# Patient Record
Sex: FEMALE | Race: WHITE | Employment: FULL TIME | URBAN - METROPOLITAN AREA
[De-identification: names, ages, dates, MRNs, and addresses within clinical notes are randomized per-mention and may not be internally consistent; named-entity substitution may affect disease eponyms.]

---

## 2017-01-02 ENCOUNTER — HOSPITAL ENCOUNTER (OUTPATIENT)
Dept: PHYSICAL THERAPY | Age: 57
Discharge: HOME | End: 2017-01-02
Payer: COMMERCIAL

## 2017-01-02 PROCEDURE — 97018 PARAFFIN BATH THERAPY: CPT

## 2017-01-02 PROCEDURE — 97110 THERAPEUTIC EXERCISES: CPT

## 2017-01-02 PROCEDURE — 97140 MANUAL THERAPY 1/> REGIONS: CPT

## 2017-01-03 ENCOUNTER — HOSPITAL ENCOUNTER (OUTPATIENT)
Dept: PHYSICAL THERAPY | Age: 57
Discharge: HOME | End: 2017-01-03
Payer: COMMERCIAL

## 2017-01-03 PROCEDURE — 97110 THERAPEUTIC EXERCISES: CPT

## 2017-01-03 PROCEDURE — 97018 PARAFFIN BATH THERAPY: CPT

## 2017-01-03 PROCEDURE — 97140 MANUAL THERAPY 1/> REGIONS: CPT

## 2017-01-03 NOTE — PROGRESS NOTES
Outpatient Rehab at [address] [place] [address], [address] [place] [address],[address] [place], [address]  Phone: [phone]   Fax: [phone]    Outpatient OCCUPATIONAL THERAPY: Daily Note 1/2/2017  Fall Risk Score: 0 (5+ = High Risk)    ICD-10: Treatment Diagnosis: Stiffness of right hand, not elsewhere classified (M25.641)  REFERRING PHYSICIAN: Ivy Ramirez MD MD Orders: Evaluate and treat : ROM ,hand based ulnar gutter splint  Return Physician Appointment: 4 weeks  MEDICAL/REFERRING DIAGNOSIS: Contracture of joint right little finger  DATE OF ONSET: several months ago   DATE OF SURGERY:12/8/2016  PRIOR LEVEL OF FUNCTION: independent  PRECAUTIONS/ALLERGIES:   Allergies   Allergen Reactions    Penicillins Rash      ASSESSMENT:  Ms. Rasta Love presents with decreased functional use, strength and range of motion of her right Little finger MCP joint dorsal aspect. and upper extremity that is affecting her independence with activities of daily living and ability to perform job tasks. I feel that Ms. Rasta Love will benefit from skilled occupational therapy to maximize the functional use of her Little finger MCP joint dorsal aspect. and upper extremity in daily activities and work tasks. ?????? ? ? This section established at most recent assessment??????????  PROBLEM LIST (Impairments causing functional limitations):  1. Pain in right little finger . 2. Decreased motion in right little finger. 3. Decreased strength in right hand. GOALS: (Goals have been discussed and agreed upon with patient.)  SHORT-TERM FUNCTIONAL GOALS: Time Frame: 4 weeks  1. Decrease pain to 4 to allow patient to perform self care tasks. 2. Increase motion in right little finger  by 15 degrees to improve functional use of upper extremity in ADL activities. 3. Increase strength in right hand by 10 pounds to allow patient to  and lift objects during self care activities. DISCHARGE GOALS: Time Frame: 12 weeks  1.  Decrease pain to 1 to allow patient to perform all household and work tasks. 2. Increase motion in right little finger by 30 degreees to allow patient to perform all ADL activities. 3. Increase strength in right hand by 15 pounds to allow patient to , lift, hold, and carry heavy objects. REHABILITATION POTENTIAL FOR STATED GOALS: Scottsara Hoffmann OF CARE:  INTERVENTIONS PLANNED: (Benefits and precautions of occupational therapy have been discussed with the patient.)  1. Modalities that may include fluidotherapy, paraffin, ultrasound, and light therapy. 2. Therapeutic exercise including a home exercise program.  3. Manual therapy. 4. Therapeutic activities. TREATMENT PLAN EFFECTIVE DATES: 12/15/2016 TO 3/15/2017  FREQUENCY/DURATION: Continue to follow patient 2 times a week for 12 weeks to address above goals. Regarding Natalie Wick's therapy, I certify that the treatment plan above will be carried out by a therapist or under their direction. Thank you for this referral,  Angelica Sood OT     Referring Physician Signature: Johanne Chen MD          Date                       SUBJECTIVE:    Present Symptoms: Pain and stiffness in right little finger. Pain Intensity 1: 1  Pain Location 1: Hand  Pain Orientation 1: Right  Dominant Side: right  History of Present Injury/Illness: The patient fractured her right wrist and has developed a contracture in his right little finger. Past Medical History: Ms. Tori Dang  has a past medical history of Anemia; GERD (gastroesophageal reflux disease); and TMJ (temporomandibular joint syndrome). She also has no past medical history of Adverse effect of anesthesia; Difficult intubation; Malignant hyperthermia due to anesthesia; Nausea & vomiting; or Pseudocholinesterase deficiency. .  She also  has a past surgical history that includes fracture tx (Left, 2000); carpal tunnel release (Left, 2/2013); knee arthroscopy (Right); orthopaedic (age 45); and orthopaedic (age 35).    Current Medications:   Current Outpatient Prescriptions on File Prior to Encounter   Medication Sig Dispense Refill    omeprazole (PRILOSEC) 20 mg capsule Take 20 mg by mouth nightly.  naproxen sodium 220 mg cap Take 2 Caps by mouth as needed. Indications: hold until after surgery. Last dose was 6-15-16      traMADol (ULTRAM) 50 mg tablet Take 50 mg by mouth every six (6) hours as needed.  busPIRone (BUSPAR) 15 mg tablet Take 15 mg by mouth nightly. Helps with TMJ pain       No current facility-administered medications on file prior to encounter. Date Last Reviewed: 1/2/2017  Social History/Home Situation:       Work/Activity History: The patient works at [employer]. OBJECTIVE:    Outcome Measures: Tool Used: Disabilities of the Arm, Shoulder and Hand (DASH) Questionnaire - Quick Version  Score:  Initial: 43/55  Most Recent: X/55 (Date: -- )   Interpretation of Score: The DASH is designed to measure the activities of daily living in person's with upper extremity dysfunction or pain. Each section is scored on a 1-5 scale, 5 representing the greatest disability. The scores of each section are added together for a total score of 55. Score 11 12-19 20-28 29-37 38-45 46-54 55   Modifier CH CI CJ CK CL CM CN     ? Carrying, Moving, and Handling Objects:     - CURRENT STATUS: CL - 60%-79% impaired, limited or restricted    - GOAL STATUS:  CK - 40%-59% impaired, limited or restricted    - D/C STATUS:  ---------------To be determined---------------  Range of Motion     AROM:       Right little finger motion is as follows: MP 0/5, PIP -20/55, DIP 0/30 degrees. Tip to distal oconnor crease 3 Cm. Strength:  Not tested due to recent surgery. Sensation:  Intact        Patient stated \"I still cant bend my little finger much. \"    Manual Therapy: (Soft Tissue Mobilization Duration  Duration: 15 Minutes  Duration: 15 Minutes): Technique: Retrograde massage (followed by light tx & PROM)  Tissue Mobilized: Scar/adhesion  Finger Mobilized: 5th digit  RUE Soft Tissue Mobilization: Yes  Technique: Retrograde massage   Therapeutic Exercise:                                                                               : The patient's home exercise program was reviewed. Date:  12/27/16 Date:  12/29/16 Date:  1/2/17 Date: Date:   Activity/Exercise Parameters Parameters Parameters Parameters Parameters   AROM during Fluidotherapy 15 min 15 min 15 min     Paraffin with Stretch 15 min  Finger flexion   15 min  Finger flexion 15 min  Finger flexion     Retrograde massage, Friction Scar massage, Joint Mobilization 15 min  Light tx   15 min  Light tx 15 min  Light tx     Scarf Curl   3 min 3 min 3 min     Washer Game        Individual Gripper          Hand [employer]          Cones          Pegs          Clothes Pins          A-R Bar          Exerstick          Velcro-Roll          BLOCKING BOARDS 5 min 5 min 5 min     RESISTIVE EXERCISES Weight/ Sets/Reps   Weight/ Sets/Reps Weight/ Sets/Reps Weight/ Sets/Reps Weight/ Sets/Reps   WEIGHT WELL        Sup/Pro        UD/RD        Wrist Flex/Ext        Free Weights          UBE(Minutes)          Nautilus        Compound Row        Vertical Chest        Overhead Press                    HEP: As above; handouts given to patient for all exercises. Therapeutic Modalities:      Right Wrist Heat  Type: Paraffin bath (with a finger flexion stretch)  Duration : 15 minutes  Patient Position: Sitting                                        Joint Mobilization:        Treatment Times:  · Therapeutic Exercise: 15 minutes  · Manual Therapy: 15 minutes  · Parafin: 15 minutes  · Whirlpool:  minutes  · Other:  minutes         __________________________________________________________________________________________________  Treatment Assessment:  Patients tolerated treatment well with no complications.   Upon completion of treatment, skin condition was normal.    Progression/Medical Necessity:   · Patient is expected to demonstrate progress in strength and range of motion to increase independence with ADL,household and work activities. .  Compliance with Program/Exercises: compliant most of the time. Reason for Continuation of Services/Other Comments:  ·   Recommendations/Intent for next treatment session: \"Treatment next visit will focus on advancements to more challenging activities\". MD progress note will be completed. Will continue per MD.  Total Treatment Duration:  OT Patient Time In/Time Out  Time In: 0430  Time Out: 0530  Regarding Natalie Wick's therapy, I certify that the treatment plan above will be carried out by a therapist or under their direction.   Thank you for this referral,  Violet Godinez, OT

## 2017-01-04 NOTE — PROGRESS NOTES
Outpatient Rehab at [address] [place] [address], [address] [place] [address],[address] [place], [address]  Phone: [phone]   Fax: [phone]    Outpatient OCCUPATIONAL THERAPY: Daily Note 1/3/2017  Fall Risk Score: 0 (5+ = High Risk)    ICD-10: Treatment Diagnosis: Stiffness of right hand, not elsewhere classified (M25.641)  REFERRING PHYSICIAN: Hector Gardiner MD MD Orders: Evaluate and treat : ROM ,hand based ulnar gutter splint  Return Physician Appointment: 4 weeks  MEDICAL/REFERRING DIAGNOSIS: Contracture of joint right little finger  DATE OF ONSET: several months ago   DATE OF SURGERY:12/8/2016  PRIOR LEVEL OF FUNCTION: independent  PRECAUTIONS/ALLERGIES:   Allergies   Allergen Reactions    Penicillins Rash      ASSESSMENT:  Ms. Murtaza Polk presents with decreased functional use, strength and range of motion of her right Little finger MCP joint dorsal aspect. and upper extremity that is affecting her independence with activities of daily living and ability to perform job tasks. I feel that Ms. Murtaza Polk will benefit from skilled occupational therapy to maximize the functional use of her Little finger MCP joint dorsal aspect. and upper extremity in daily activities and work tasks. ?????? ? ? This section established at most recent assessment??????????  PROBLEM LIST (Impairments causing functional limitations):  1. Pain in right little finger . 2. Decreased motion in right little finger. 3. Decreased strength in right hand. GOALS: (Goals have been discussed and agreed upon with patient.)  SHORT-TERM FUNCTIONAL GOALS: Time Frame: 4 weeks  1. Decrease pain to 4 to allow patient to perform self care tasks. 2. Increase motion in right little finger  by 15 degrees to improve functional use of upper extremity in ADL activities. 3. Increase strength in right hand by 10 pounds to allow patient to  and lift objects during self care activities. DISCHARGE GOALS: Time Frame: 12 weeks  1.  Decrease pain to 1 to allow patient to perform all household and work tasks. 2. Increase motion in right little finger by 30 degreees to allow patient to perform all ADL activities. 3. Increase strength in right hand by 15 pounds to allow patient to , lift, hold, and carry heavy objects. REHABILITATION POTENTIAL FOR STATED GOALS: Nuris Auguste OF CARE:  INTERVENTIONS PLANNED: (Benefits and precautions of occupational therapy have been discussed with the patient.)  1. Modalities that may include fluidotherapy, paraffin, ultrasound, and light therapy. 2. Therapeutic exercise including a home exercise program.  3. Manual therapy. 4. Therapeutic activities. TREATMENT PLAN EFFECTIVE DATES: 12/15/2016 TO 3/15/2017  FREQUENCY/DURATION: Continue to follow patient 2 times a week for 12 weeks to address above goals. Regarding Natalie Wick's therapy, I certify that the treatment plan above will be carried out by a therapist or under their direction. Thank you for this referral,  Kaur Hong OT     Referring Physician Signature: Jose Jean MD          Date                       SUBJECTIVE:    Present Symptoms: Pain and stiffness in right little finger. Pain Intensity 1: 1  Pain Location 1: Hand (little finger)  Pain Orientation 1: Right  Dominant Side: right  History of Present Injury/Illness: The patient fractured her right wrist and has developed a contracture in his right little finger. Past Medical History: Ms. Rosales Boyer  has a past medical history of Anemia; GERD (gastroesophageal reflux disease); and TMJ (temporomandibular joint syndrome). She also has no past medical history of Adverse effect of anesthesia; Difficult intubation; Malignant hyperthermia due to anesthesia; Nausea & vomiting; or Pseudocholinesterase deficiency. .  She also  has a past surgical history that includes fracture tx (Left, 2000); carpal tunnel release (Left, 2/2013); knee arthroscopy (Right); orthopaedic (age 45); and orthopaedic (age 35).   Current Medications:   Current Outpatient Prescriptions on File Prior to Encounter   Medication Sig Dispense Refill    omeprazole (PRILOSEC) 20 mg capsule Take 20 mg by mouth nightly.  naproxen sodium 220 mg cap Take 2 Caps by mouth as needed. Indications: hold until after surgery. Last dose was 6-15-16      traMADol (ULTRAM) 50 mg tablet Take 50 mg by mouth every six (6) hours as needed.  busPIRone (BUSPAR) 15 mg tablet Take 15 mg by mouth nightly. Helps with TMJ pain       No current facility-administered medications on file prior to encounter. Date Last Reviewed: 1/2/2017  Social History/Home Situation:       Work/Activity History: The patient works at [employer]. OBJECTIVE:    Outcome Measures: Tool Used: Disabilities of the Arm, Shoulder and Hand (DASH) Questionnaire - Quick Version  Score:  Initial: 43/55  Most Recent: X/55 (Date: -- )   Interpretation of Score: The DASH is designed to measure the activities of daily living in person's with upper extremity dysfunction or pain. Each section is scored on a 1-5 scale, 5 representing the greatest disability. The scores of each section are added together for a total score of 55. Score 11 12-19 20-28 29-37 38-45 46-54 55   Modifier CH CI CJ CK CL CM CN     ? Carrying, Moving, and Handling Objects:     - CURRENT STATUS: CL - 60%-79% impaired, limited or restricted    - GOAL STATUS:  CK - 40%-59% impaired, limited or restricted    - D/C STATUS:  ---------------To be determined---------------  Range of Motion     AROM:       Right little finger motion is as follows: MP 0/5, PIP -20/55, DIP 0/30 degrees. Tip to distal oconnor crease 3 Cm. Strength:  Not tested due to recent surgery. Sensation:  Intact        Patient stated \"I still cant bend my little finger much. \"    Manual Therapy: (Soft Tissue Mobilization Duration  Duration: 15 Minutes  Duration: 15 Minutes): Technique: Retrograde massage (with light tx & PROM)  Tissue Mobilized: Scar/adhesion  RUE Soft Tissue Mobilization: Yes  Technique: Retrograde massage   Therapeutic Exercise:                                                                               : The patient's home exercise program was reviewed. Date:  12/27/16 Date:  12/29/16 Date:  1/2/17 Date:  1/3/17 Date:   Activity/Exercise Parameters Parameters Parameters Parameters Parameters   AROM during Fluidotherapy 15 min 15 min 15 min 15 min    Paraffin with Stretch 15 min  Finger flexion   15 min  Finger flexion 15 min  Finger flexion 15 min  Finger flexion    Retrograde massage, Friction Scar massage, Joint Mobilization 15 min  Light tx   15 min  Light tx 15 min  Light tx 15 min  Light tx    Scarf Curl   3 min 3 min 3 min 2 min    Washer Game        Individual Gripper          Hand [place]          Cones          Pegs          Clothes Pins          A-R Bar          Exerstick          PROM      2 min    BLOCKING BOARDS 5 min 5 min 5 min 3 min    RESISTIVE EXERCISES Weight/ Sets/Reps   Weight/ Sets/Reps Weight/ Sets/Reps Weight/ Sets/Reps Weight/ Sets/Reps   WEIGHT WELL        Sup/Pro        UD/RD        Wrist Flex/Ext        Free Weights          UBE(Minutes)          Nautilus        Compound Row        Vertical Chest        Overhead Press                    HEP: As above; handouts given to patient for all exercises.     Therapeutic Modalities:      Right Wrist Heat  Type: Paraffin bath (with a finger flexion stretch)  Duration : 15 minutes  Patient Position: Sitting                                        Joint Mobilization:        Treatment Times:  · Therapeutic Exercise: 15 minutes  · Manual Therapy: 15 minutes  · Parafin: 15 minutes  · Whirlpool:  minutes  · Other:  minutes         __________________________________________________________________________________________________  Treatment Assessment:  Patients tolerated treatment well with no complications. Upon completion of treatment, skin condition was normal.    Progression/Medical Necessity:   · Patient is expected to demonstrate progress in strength and range of motion to increase independence with ADL,household and work activities. .  Compliance with Program/Exercises: compliant most of the time. Reason for Continuation of Services/Other Comments:  ·   Recommendations/Intent for next treatment session: \"Treatment next visit will focus on advancements to more challenging activities\". MD progress note  completed. Will continue per MD.  Total Treatment Duration:  OT Patient Time In/Time Out  Time In: 0130  Time Out: 0215  Regarding Natalie Wick's therapy, I certify that the treatment plan above will be carried out by a therapist or under their direction.   Thank you for this referral,  Yariel Orlando, OT

## 2017-01-12 ENCOUNTER — HOSPITAL ENCOUNTER (OUTPATIENT)
Dept: PHYSICAL THERAPY | Age: 57
Discharge: HOME | End: 2017-01-12
Payer: COMMERCIAL

## 2017-01-12 PROCEDURE — 97140 MANUAL THERAPY 1/> REGIONS: CPT

## 2017-01-12 PROCEDURE — 97018 PARAFFIN BATH THERAPY: CPT

## 2017-01-12 PROCEDURE — 97110 THERAPEUTIC EXERCISES: CPT

## 2017-01-12 NOTE — PROGRESS NOTES
Outpatient Rehab at [address] [place] [address], [address] [place] [address],[address] [place], [address]  Phone: [phone]   Fax: [phone]    Outpatient OCCUPATIONAL THERAPY: Daily Note 1/12/2017  Fall Risk Score: 0 (5+ = High Risk)    ICD-10: Treatment Diagnosis: Stiffness of right hand, not elsewhere classified (M25.641)  REFERRING PHYSICIAN: Oracio Champagne MD MD Orders: Evaluate and treat : ROM ,hand based ulnar gutter splint  Return Physician Appointment: 4 weeks  MEDICAL/REFERRING DIAGNOSIS: Contracture of joint right little finger  DATE OF ONSET: several months ago   DATE OF SURGERY:12/8/2016  PRIOR LEVEL OF FUNCTION: independent  PRECAUTIONS/ALLERGIES:   Allergies   Allergen Reactions    Penicillins Rash      ASSESSMENT:  Ms. Adonis Khan presents with decreased functional use, strength and range of motion of her right Little finger MCP joint dorsal aspect. and upper extremity that is affecting her independence with activities of daily living and ability to perform job tasks. I feel that Ms. Adonis Khan will benefit from skilled occupational therapy to maximize the functional use of her Little finger MCP joint dorsal aspect. and upper extremity in daily activities and work tasks. ?????? ? ? This section established at most recent assessment??????????  PROBLEM LIST (Impairments causing functional limitations):  1. Pain in right little finger . 2. Decreased motion in right little finger. 3. Decreased strength in right hand. GOALS: (Goals have been discussed and agreed upon with patient.)  SHORT-TERM FUNCTIONAL GOALS: Time Frame: 4 weeks  1. Decrease pain to 4 to allow patient to perform self care tasks. 2. Increase motion in right little finger  by 15 degrees to improve functional use of upper extremity in ADL activities. 3. Increase strength in right hand by 10 pounds to allow patient to  and lift objects during self care activities. DISCHARGE GOALS: Time Frame: 12 weeks  1.  Decrease pain to 1 to allow patient to perform all household and work tasks. 2. Increase motion in right little finger by 30 degreees to allow patient to perform all ADL activities. 3. Increase strength in right hand by 15 pounds to allow patient to , lift, hold, and carry heavy objects. REHABILITATION POTENTIAL FOR STATED GOALS: Norma Dejesus OF CARE:  INTERVENTIONS PLANNED: (Benefits and precautions of occupational therapy have been discussed with the patient.)  1. Modalities that may include fluidotherapy, paraffin, ultrasound, and light therapy. 2. Therapeutic exercise including a home exercise program.  3. Manual therapy. 4. Therapeutic activities. TREATMENT PLAN EFFECTIVE DATES: 12/15/2016 TO 3/15/2017  FREQUENCY/DURATION: Continue to follow patient 2 times a week for 12 weeks to address above goals. Regarding Natalie Wick's therapy, I certify that the treatment plan above will be carried out by a therapist or under their direction. Thank you for this referral,  Frederick Villasenor, OT     Referring Physician Signature: Pauline Welsh MD          Date                       SUBJECTIVE:    Present Symptoms: Pain and stiffness in right little finger. Pain Intensity 1: 0  Pain Location 1: Hand  Pain Orientation 1: Right  Dominant Side: right  History of Present Injury/Illness: The patient fractured her right wrist and has developed a contracture in his right little finger. Past Medical History: Ms. Avi Murphy  has a past medical history of Anemia; GERD (gastroesophageal reflux disease); and TMJ (temporomandibular joint syndrome). She also has no past medical history of Adverse effect of anesthesia; Difficult intubation; Malignant hyperthermia due to anesthesia; Nausea & vomiting; or Pseudocholinesterase deficiency. .  She also  has a past surgical history that includes fracture tx (Left, 2000); carpal tunnel release (Left, 2/2013); knee arthroscopy (Right); orthopaedic (age 45); and orthopaedic (age 35).    Current Medications:   Current Outpatient Prescriptions on File Prior to Encounter   Medication Sig Dispense Refill    omeprazole (PRILOSEC) 20 mg capsule Take 20 mg by mouth nightly.  naproxen sodium 220 mg cap Take 2 Caps by mouth as needed. Indications: hold until after surgery. Last dose was 6-15-16      traMADol (ULTRAM) 50 mg tablet Take 50 mg by mouth every six (6) hours as needed.  busPIRone (BUSPAR) 15 mg tablet Take 15 mg by mouth nightly. Helps with TMJ pain       No current facility-administered medications on file prior to encounter. Date Last Reviewed: 1/12/2017   Social History/Home Situation:       Work/Activity History: The patient works at [employer]. OBJECTIVE:    Outcome Measures: Tool Used: Disabilities of the Arm, Shoulder and Hand (DASH) Questionnaire - Quick Version  Score:  Initial: 43/55  Most Recent: X/55 (Date: -- )   Interpretation of Score: The DASH is designed to measure the activities of daily living in person's with upper extremity dysfunction or pain. Each section is scored on a 1-5 scale, 5 representing the greatest disability. The scores of each section are added together for a total score of 55. Score 11 12-19 20-28 29-37 38-45 46-54 55   Modifier CH CI CJ CK CL CM CN     ? Carrying, Moving, and Handling Objects:     - CURRENT STATUS: CL - 60%-79% impaired, limited or restricted    - GOAL STATUS:  CK - 40%-59% impaired, limited or restricted    - D/C STATUS:  ---------------To be determined---------------  Range of Motion     AROM:       Right little finger motion is as follows: MP 0/5, PIP -20/55, DIP 0/30 degrees. Tip to distal oconnor crease 3 Cm. Strength:  Not tested due to recent surgery. Sensation:  Intact        Patient stated \"I just can't move my little finger. \"    Manual Therapy: (Soft Tissue Mobilization Duration  Duration: 15 Minutes  Duration: 15 Minutes): Technique: Retrograde massage (with a finger flexion stretch)  Tissue Mobilized: Scar/adhesion  RUE Soft Tissue Mobilization: Yes   Therapeutic Exercise:                                                                               : The patient's home exercise program was reviewed. Date:  12/27/16 Date:  12/29/16 Date:  1/2/17 Date:  1/3/17 Date:  1/12/17   Activity/Exercise Parameters Parameters Parameters Parameters Parameters   AROM during Fluidotherapy 15 min 15 min 15 min 15 min 15 min   Paraffin with Stretch 15 min  Finger flexion   15 min  Finger flexion 15 min  Finger flexion 15 min  Finger flexion 15 min  Finger flexion   Retrograde massage, Friction Scar massage, Joint Mobilization 15 min  Light tx   15 min  Light tx 15 min  Light tx 15 min  Light tx 15 min  Light tx   Scarf Curl   3 min 3 min 3 min 2 min 2 min   Washer Game        Individual Gripper       40 reps   Hand Deaver          Cones          Pegs          Clothes Pins          A-R Bar          Exerstick          PROM      2 min 2 min   BLOCKING BOARDS 5 min 5 min 5 min 3 min    RESISTIVE EXERCISES Weight/ Sets/Reps   Weight/ Sets/Reps Weight/ Sets/Reps Weight/ Sets/Reps Weight/ Sets/Reps   WEIGHT WELL        Sup/Pro        UD/RD        Wrist Flex/Ext        Free Weights          UBE(Minutes)          Nautilus        Compound Row        Vertical Chest        Overhead Press                    HEP: As above; handouts given to patient for all exercises.     Therapeutic Modalities:      Right Wrist Heat  Type: Paraffin bath (with a finger flexion stretch)  Duration : 15 minutes  Patient Position: Sitting                                        Joint Mobilization:        Treatment Times:  · Therapeutic Exercise: 15 minutes  · Manual Therapy: 15 minutes  · Parafin: 15 minutes  · Whirlpool:  minutes  · Other:  minutes         __________________________________________________________________________________________________  Treatment Assessment: Patients tolerated treatment well with no complications. Upon completion of treatment, skin condition was normal.    Progression/Medical Necessity:   · Patient is expected to demonstrate progress in strength and range of motion to increase independence with ADL,household and work activities. .  Compliance with Program/Exercises: compliant most of the time. Reason for Continuation of Services/Other Comments:  ·   Recommendations/Intent for next treatment session: \"Treatment next visit will focus on advancements to more challenging activities\"  Will continue per MD.  Total Treatment Duration:  OT Patient Time In/Time Out  Time In: 1100  Time Out: [facility] therapy, I certify that the treatment plan above will be carried out by a therapist or under their direction.   Thank you for this referral,  Peter Morales, OT

## 2017-01-16 ENCOUNTER — HOSPITAL ENCOUNTER (OUTPATIENT)
Dept: PHYSICAL THERAPY | Age: 57
Discharge: HOME | End: 2017-01-16
Payer: COMMERCIAL

## 2017-01-16 PROCEDURE — 97140 MANUAL THERAPY 1/> REGIONS: CPT

## 2017-01-16 PROCEDURE — 97018 PARAFFIN BATH THERAPY: CPT

## 2017-01-16 PROCEDURE — 97110 THERAPEUTIC EXERCISES: CPT

## 2017-01-17 NOTE — PROGRESS NOTES
Outpatient Rehab at [address] [place] [address], [address] [place] [address],[address] [place], [address]  Phone: [phone]   Fax: [phone]    Outpatient OCCUPATIONAL THERAPY: Daily Note 1/16/2017  Fall Risk Score: 0 (5+ = High Risk)    ICD-10: Treatment Diagnosis: Stiffness of right hand, not elsewhere classified (M25.641)  REFERRING PHYSICIAN: Perez Velasco MD MD Orders: Evaluate and treat : ROM ,hand based ulnar gutter splint  Return Physician Appointment: 4 weeks  MEDICAL/REFERRING DIAGNOSIS: Contracture of joint right little finger  DATE OF ONSET: several months ago   DATE OF SURGERY:12/8/2016  PRIOR LEVEL OF FUNCTION: independent  PRECAUTIONS/ALLERGIES:   Allergies   Allergen Reactions    Penicillins Rash      ASSESSMENT:  Ms. Meño Dumont presents with decreased functional use, strength and range of motion of her right Little finger MCP joint dorsal aspect. and upper extremity that is affecting her independence with activities of daily living and ability to perform job tasks. I feel that Ms. Meño Dumont will benefit from skilled occupational therapy to maximize the functional use of her Little finger MCP joint dorsal aspect. and upper extremity in daily activities and work tasks. ?????? ? ? This section established at most recent assessment??????????  PROBLEM LIST (Impairments causing functional limitations):  1. Pain in right little finger . 2. Decreased motion in right little finger. 3. Decreased strength in right hand. GOALS: (Goals have been discussed and agreed upon with patient.)  SHORT-TERM FUNCTIONAL GOALS: Time Frame: 4 weeks  1. Decrease pain to 4 to allow patient to perform self care tasks. 2. Increase motion in right little finger  by 15 degrees to improve functional use of upper extremity in ADL activities. 3. Increase strength in right hand by 10 pounds to allow patient to  and lift objects during self care activities. DISCHARGE GOALS: Time Frame: 12 weeks  1.  Decrease pain to 1 to allow patient to perform all household and work tasks. 2. Increase motion in right little finger by 30 degreees to allow patient to perform all ADL activities. 3. Increase strength in right hand by 15 pounds to allow patient to , lift, hold, and carry heavy objects. REHABILITATION POTENTIAL FOR STATED GOALS: [facility] OF CARE:  INTERVENTIONS PLANNED: (Benefits and precautions of occupational therapy have been discussed with the patient.)  1. Modalities that may include fluidotherapy, paraffin, ultrasound, and light therapy. 2. Therapeutic exercise including a home exercise program.  3. Manual therapy. 4. Therapeutic activities. TREATMENT PLAN EFFECTIVE DATES: 12/15/2016 TO 3/15/2017  FREQUENCY/DURATION: Continue to follow patient 2 times a week for 12 weeks to address above goals. Regarding Natalie Wick's therapy, I certify that the treatment plan above will be carried out by a therapist or under their direction. Thank you for this referral,  [facility]      Referring Physician Signature: Sherry Cowart MD          Date                       SUBJECTIVE:    Present Symptoms: Pain and stiffness in right little finger. Pain Intensity 1: 0  Pain Location 1: Hand  Pain Orientation 1: Right  Dominant Side: right  History of Present Injury/Illness: The patient fractured her right wrist and has developed a contracture in his right little finger. Past Medical History: Ms. Jonathon Herrera  has a past medical history of Anemia; GERD (gastroesophageal reflux disease); and TMJ (temporomandibular joint syndrome). She also has no past medical history of Adverse effect of anesthesia; Difficult intubation; Malignant hyperthermia due to anesthesia; Nausea & vomiting; or Pseudocholinesterase deficiency. .  She also  has a past surgical history that includes fracture tx (Left, 2000); carpal tunnel release (Left, 2/2013); knee arthroscopy (Right); orthopaedic (age 45); and orthopaedic (age 35).    Current Medications:   Current Outpatient Prescriptions on File Prior to Encounter   Medication Sig Dispense Refill    omeprazole (PRILOSEC) 20 mg capsule Take 20 mg by mouth nightly.  naproxen sodium 220 mg cap Take 2 Caps by mouth as needed. Indications: hold until after surgery. Last dose was 6-15-16      traMADol (ULTRAM) 50 mg tablet Take 50 mg by mouth every six (6) hours as needed.  busPIRone (BUSPAR) 15 mg tablet Take 15 mg by mouth nightly. Helps with TMJ pain       No current facility-administered medications on file prior to encounter. Date Last Reviewed: 1/17/2017   Social History/Home Situation:       Work/Activity History: The patient works at [employer]. OBJECTIVE:    Outcome Measures: Tool Used: Disabilities of the Arm, Shoulder and Hand (DASH) Questionnaire - Quick Version  Score:  Initial: 43/55  Most Recent: X/55 (Date: -- )   Interpretation of Score: The DASH is designed to measure the activities of daily living in person's with upper extremity dysfunction or pain. Each section is scored on a 1-5 scale, 5 representing the greatest disability. The scores of each section are added together for a total score of 55. Score 11 12-19 20-28 29-37 38-45 46-54 55   Modifier CH CI CJ CK CL CM CN     ? Carrying, Moving, and Handling Objects:     - CURRENT STATUS: CL - 60%-79% impaired, limited or restricted    - GOAL STATUS:  CK - 40%-59% impaired, limited or restricted    - D/C STATUS:  ---------------To be determined---------------  Range of Motion     AROM:       Right little finger motion is as follows: MP 0/5, PIP -20/55, DIP 0/30 degrees. Tip to distal oconnor crease 3 Cm. Strength:  Not tested due to recent surgery. Sensation:  Intact        Patient stated \"I just can't move my little finger. \"    Manual Therapy: (Soft Tissue Mobilization Duration  Duration: 15 Minutes  Duration: 15 Minutes): Technique: Retrograde massage (followed by PROM)  Tissue Mobilized: Scar/adhesion  RUE Soft Tissue Mobilization: Yes  Technique: Retrograde massage   Therapeutic Exercise:                                                                               : The patient's home exercise program was reviewed. Date:  1/16/17 Date:  12/29/16 Date:  1/2/17 Date:  1/3/17 Date:  1/12/17   Activity/Exercise Parameters Parameters Parameters Parameters Parameters   AROM during Fluidotherapy 15 min 15 min 15 min 15 min 15 min   Paraffin with Stretch 15 min  Finger flexion   15 min  Finger flexion 15 min  Finger flexion 15 min  Finger flexion 15 min  Finger flexion   Retrograde massage, Friction Scar massage, Joint Mobilization 15 min  Light tx   15 min  Light tx 15 min  Light tx 15 min  Light tx 15 min  Light tx   Scarf Curl   3 min 3 min 3 min 2 min 2 min   Washer Game        Individual Gripper       40 reps   Hand Ronald          Cones          Pegs          Clothes Pins          A-R Bar          Exerstick          PROM 2 min     2 min 2 min   BLOCKING BOARDS  5 min 5 min 3 min    RESISTIVE EXERCISES Weight/ Sets/Reps   Weight/ Sets/Reps Weight/ Sets/Reps Weight/ Sets/Reps Weight/ Sets/Reps   WEIGHT WELL        Sup/Pro        UD/RD        Wrist Flex/Ext        Free Weights          UBE(Minutes)          Nautilus        Compound Row        Vertical Chest        Overhead Press                    HEP: As above; handouts given to patient for all exercises.     Therapeutic Modalities:      Right Wrist Heat  Type: Paraffin bath (with a finger flexion stretch)  Duration : 15 minutes  Patient Position: Sitting                                        Joint Mobilization:        Treatment Times:  · Therapeutic Exercise: 15 minutes  · Manual Therapy: 15 minutes  · Parafin: 15 minutes  · Whirlpool:  minutes  · Other:  minutes         __________________________________________________________________________________________________  Treatment Assessment:  Patients tolerated treatment well with no complications. Upon completion of treatment, skin condition was normal.    Progression/Medical Necessity:   · Patient is expected to demonstrate progress in strength and range of motion to increase independence with ADL,household and work activities. .  Compliance with Program/Exercises: compliant most of the time. Reason for Continuation of Services/Other Comments:  ·   Recommendations/Intent for next treatment session: \"Treatment next visit will focus on advancements to more challenging activities\"  Will continue per MD.  Total Treatment Duration:  OT Patient Time In/Time Out  Time In: 0330  Time Out: 0415  Regarding Natalie Wick's therapy, I certify that the treatment plan above will be carried out by a therapist or under their direction.   Thank you for this referral,  Violet Godinez OT

## 2017-01-24 ENCOUNTER — APPOINTMENT (OUTPATIENT)
Dept: PHYSICAL THERAPY | Age: 57
End: 2017-01-24
Payer: COMMERCIAL

## 2017-01-30 ENCOUNTER — HOSPITAL ENCOUNTER (OUTPATIENT)
Dept: PHYSICAL THERAPY | Age: 57
Discharge: HOME | End: 2017-01-30
Payer: COMMERCIAL

## 2017-01-30 PROCEDURE — 97140 MANUAL THERAPY 1/> REGIONS: CPT

## 2017-01-30 PROCEDURE — 97018 PARAFFIN BATH THERAPY: CPT

## 2017-01-30 PROCEDURE — 97110 THERAPEUTIC EXERCISES: CPT

## 2017-01-31 ENCOUNTER — APPOINTMENT (OUTPATIENT)
Dept: PHYSICAL THERAPY | Age: 57
End: 2017-01-31
Payer: COMMERCIAL

## 2017-01-31 NOTE — PROGRESS NOTES
Outpatient Rehab at [address] [place] [address], [address] [place] [address],[address] [place], [address]  Phone: [phone]   Fax: [phone]    Outpatient OCCUPATIONAL THERAPY: Daily Note 1/30/2017  Fall Risk Score: 0 (5+ = High Risk)    ICD-10: Treatment Diagnosis: Stiffness of right hand, not elsewhere classified (M25.641)  REFERRING PHYSICIAN: Mini Enamorado MD MD Orders: Evaluate and treat : ROM ,hand based ulnar gutter splint  Return Physician Appointment: 4 weeks  MEDICAL/REFERRING DIAGNOSIS: Contracture of joint right little finger  DATE OF ONSET: several months ago   DATE OF SURGERY:12/8/2016  PRIOR LEVEL OF FUNCTION: independent  PRECAUTIONS/ALLERGIES:   Allergies   Allergen Reactions    Penicillins Rash      ASSESSMENT:  Ms. Tony Alcaraz presents with decreased functional use, strength and range of motion of her right Little finger MCP joint dorsal aspect. and upper extremity that is affecting her independence with activities of daily living and ability to perform job tasks. I feel that Ms. Tony Alcaraz will benefit from skilled occupational therapy to maximize the functional use of her Little finger MCP joint dorsal aspect. and upper extremity in daily activities and work tasks. ?????? ? ? This section established at most recent assessment??????????  PROBLEM LIST (Impairments causing functional limitations):  1. Pain in right little finger . 2. Decreased motion in right little finger. 3. Decreased strength in right hand. GOALS: (Goals have been discussed and agreed upon with patient.)  SHORT-TERM FUNCTIONAL GOALS: Time Frame: 4 weeks  1. Decrease pain to 4 to allow patient to perform self care tasks. 2. Increase motion in right little finger  by 15 degrees to improve functional use of upper extremity in ADL activities. 3. Increase strength in right hand by 10 pounds to allow patient to  and lift objects during self care activities. DISCHARGE GOALS: Time Frame: 12 weeks  1.  Decrease pain to 1 to allow patient to perform all household and work tasks. 2. Increase motion in right little finger by 30 degreees to allow patient to perform all ADL activities. 3. Increase strength in right hand by 15 pounds to allow patient to , lift, hold, and carry heavy objects. REHABILITATION POTENTIAL FOR STATED GOALS: Margaret Cabello OF CARE:  INTERVENTIONS PLANNED: (Benefits and precautions of occupational therapy have been discussed with the patient.)  1. Modalities that may include fluidotherapy, paraffin, ultrasound, and light therapy. 2. Therapeutic exercise including a home exercise program.  3. Manual therapy. 4. Therapeutic activities. TREATMENT PLAN EFFECTIVE DATES: 12/15/2016 TO 3/15/2017  FREQUENCY/DURATION: Continue to follow patient 2 times a week for 12 weeks to address above goals. Regarding Natalie Wick's therapy, I certify that the treatment plan above will be carried out by a therapist or under their direction. Thank you for this referral,  Misael Corona OT     Referring Physician Signature: Ivy Ramirez MD          Date                       SUBJECTIVE:    Present Symptoms: Pain and stiffness in right little finger. Pain Intensity 1: 0  Pain Location 1: Hand  Pain Orientation 1: Right  Dominant Side: right  History of Present Injury/Illness: The patient fractured her right wrist and has developed a contracture in his right little finger. Past Medical History: Ms. Rasta Love  has a past medical history of Anemia; GERD (gastroesophageal reflux disease); and TMJ (temporomandibular joint syndrome). She also has no past medical history of Adverse effect of anesthesia; Difficult intubation; Malignant hyperthermia due to anesthesia; Nausea & vomiting; or Pseudocholinesterase deficiency. .  She also  has a past surgical history that includes fracture tx (Left, 2000); carpal tunnel release (Left, 2/2013); knee arthroscopy (Right); orthopaedic (age 45); and orthopaedic (age 35).    Current Medications:   Current Outpatient Prescriptions on File Prior to Encounter   Medication Sig Dispense Refill    omeprazole (PRILOSEC) 20 mg capsule Take 20 mg by mouth nightly.  naproxen sodium 220 mg cap Take 2 Caps by mouth as needed. Indications: hold until after surgery. Last dose was 6-15-16      traMADol (ULTRAM) 50 mg tablet Take 50 mg by mouth every six (6) hours as needed.  busPIRone (BUSPAR) 15 mg tablet Take 15 mg by mouth nightly. Helps with TMJ pain       No current facility-administered medications on file prior to encounter. Date Last Reviewed: 1/30/2017  Social History/Home Situation:       Work/Activity History: The patient works at [employer]. OBJECTIVE:    Outcome Measures: Tool Used: Disabilities of the Arm, Shoulder and Hand (DASH) Questionnaire - Quick Version  Score:  Initial: 43/55  Most Recent: X/55 (Date: -- )   Interpretation of Score: The DASH is designed to measure the activities of daily living in person's with upper extremity dysfunction or pain. Each section is scored on a 1-5 scale, 5 representing the greatest disability. The scores of each section are added together for a total score of 55. Score 11 12-19 20-28 29-37 38-45 46-54 55   Modifier CH CI CJ CK CL CM CN     ? Carrying, Moving, and Handling Objects:     - CURRENT STATUS: CL - 60%-79% impaired, limited or restricted    - GOAL STATUS:  CK - 40%-59% impaired, limited or restricted    - D/C STATUS:  ---------------To be determined---------------  Range of Motion     AROM:       Right little finger motion is as follows: MP 0/5, PIP -20/55, DIP 0/30 degrees. Tip to distal oconnor crease 3 Cm. Strength:  Not tested due to recent surgery. Sensation:  Intact        Patient stated \"I am about the same. \"    Manual Therapy: (Soft Tissue Mobilization Duration  Duration: 15 Minutes  Duration: 15 Minutes): Technique: Retrograde massage (followed by PROM)  Tissue Mobilized: Scar/adhesion  RUE Soft Tissue Mobilization: Yes  Technique: Retrograde massage   Therapeutic Exercise:                                                                               : The patient's home exercise program was reviewed. Date:  1/16/17 Date:  1/30/17 Date:  1/2/17 Date:  1/3/17 Date:  1/12/17   Activity/Exercise Parameters Parameters Parameters Parameters Parameters   AROM during Fluidotherapy 15 min 15 min 15 min 15 min 15 min   Paraffin with Stretch 15 min  Finger flexion   15 min  Finger flexion 15 min  Finger flexion 15 min  Finger flexion 15 min  Finger flexion   Retrograde massage, Friction Scar massage, Joint Mobilization 15 min  Light tx   15 min  Light tx 15 min  Light tx 15 min  Light tx 15 min  Light tx   Scarf Curl   3 min 3 min 3 min 2 min 2 min   Washer Game        Individual Gripper       40 reps   Hand [place]          Cones          Pegs          Clothes Pins          A-R Bar          Exerstick          PROM 2 min     2 min 2 min   BLOCKING BOARDS   5 min 3 min    RESISTIVE EXERCISES Weight/ Sets/Reps   Weight/ Sets/Reps Weight/ Sets/Reps Weight/ Sets/Reps Weight/ Sets/Reps   WEIGHT WELL        Sup/Pro        UD/RD        Wrist Flex/Ext        Free Weights          UBE(Minutes)          Nautilus        Compound Row        Vertical Chest        Overhead Press                    HEP: As above; handouts given to patient for all exercises.     Therapeutic Modalities:      Right Wrist Heat  Type: Paraffin bath (with a finger flexion stretch)  Duration : 15 minutes  Patient Position: Sitting                                        Joint Mobilization:        Treatment Times:  · Therapeutic Exercise: 15 minutes  · Manual Therapy: 15 minutes  · Parafin: 15 minutes  · Whirlpool:  minutes  · Other:  minutes         __________________________________________________________________________________________________  Treatment Assessment: Patients tolerated treatment well with no complications. Upon completion of treatment, skin condition was normal.    Progression/Medical Necessity:   · Patient is expected to demonstrate progress in strength and range of motion to increase independence with ADL,household and work activities. .  Compliance with Program/Exercises: compliant most of the time. Reason for Continuation of Services/Other Comments:  ·   Recommendations/Intent for next treatment session: \"Treatment next visit will focus on advancements to more challenging activities\"  MD progress note will be completed. .  Total Treatment Duration:  OT Patient Time In/Time Out  Time In: 0100  Time Out: 0145  Regarding Natalie Wick's therapy, I certify that the treatment plan above will be carried out by a therapist or under their direction.   Thank you for this referral,  Colt Vega, OT

## 2017-03-02 NOTE — PROGRESS NOTES
Outpatient Rehab at [address] [place] [address], [address] [place] [address],[address] [place], [address]  Phone: [phone]   Fax: [phone]    Outpatient OCCUPATIONAL THERAPY: Discharge   Fall Risk Score: 0 (5+ = High Risk)    ICD-10: Treatment Diagnosis: Stiffness of right hand, not elsewhere classified (M25.641)  REFERRING PHYSICIAN: Christian Muse MD MD Orders: Evaluate and treat : ROM ,hand based ulnar gutter splint  Return Physician Appointment: 4 weeks  MEDICAL/REFERRING DIAGNOSIS: Contracture of joint right little finger  DATE OF ONSET: several months ago   DATE OF SURGERY:12/8/2016  PRIOR LEVEL OF FUNCTION: independent  PRECAUTIONS/ALLERGIES:   Allergies   Allergen Reactions    Penicillins Rash      ASSESSMENT:  Ms. Elen Nowak presents with decreased functional use, strength and range of motion of her right Little finger MCP joint dorsal aspect. and upper extremity that is affecting her independence with activities of daily living and ability to perform job tasks. I feel that Ms. Elen Nowak will benefit from skilled occupational therapy to maximize the functional use of her Little finger MCP joint dorsal aspect. and upper extremity in daily activities and work tasks. ?????? ? ? This section established at most recent assessment??????????  PROBLEM LIST (Impairments causing functional limitations):  1. Pain in right little finger . 2. Decreased motion in right little finger. 3. Decreased strength in right hand. GOALS: (Goals have been discussed and agreed upon with patient.)  SHORT-TERM FUNCTIONAL GOALS: Time Frame: 4 weeks  1. Decrease pain to 4 to allow patient to perform self care tasks. ( GOAL MET )  2. Increase motion in right little finger  by 15 degrees to improve functional use of upper extremity in ADL activities. ( GOAL MET )  3. Increase strength in right hand by 10 pounds to allow patient to  and lift objects during self care activities. ( GOAL MET )  DISCHARGE GOALS: Time Frame: 12 weeks  1. Decrease pain to 1 to allow patient to perform all household and work tasks. ( GOAL MET )  2. Increase motion in right little finger by 30 degreees to allow patient to perform all ADL activities. ( GOAL NOT MET )  3. Increase strength in right hand by 15 pounds to allow patient to , lift, hold, and carry heavy objects. ( GOAL MET )  REHABILITATION POTENTIAL FOR STATED GOALS: Evelyn Guthrie OF CARE:  INTERVENTIONS PLANNED: (Benefits and precautions of occupational therapy have been discussed with the patient.)  1. Modalities that may include fluidotherapy, paraffin, ultrasound, and light therapy. 2. Therapeutic exercise including a home exercise program.  3. Manual therapy. 4. Therapeutic activities. TREATMENT PLAN EFFECTIVE DATES: 12/15/2016 TO 3/15/2017  FREQUENCY/DURATION: To discharge at this time. Regarding Natalie Wick's therapy, I certify that the treatment plan above will be carried out by a therapist or under their direction. Thank you for this referral,  Peter Morales OT     Referring Physician Signature: Saúl Jang MD          Date                       SUBJECTIVE:    Present Symptoms: Pain and stiffness in right little finger. Pain Intensity 1: 0  Pain Location 1: Hand  Pain Orientation 1: Right  Dominant Side: right  History of Present Injury/Illness: The patient fractured her right wrist and has developed a contracture in his right little finger. Past Medical History: Ms. Rosette Levine  has a past medical history of Anemia; GERD (gastroesophageal reflux disease); and TMJ (temporomandibular joint syndrome). She also has no past medical history of Adverse effect of anesthesia; Difficult intubation; Malignant hyperthermia due to anesthesia; Nausea & vomiting; or Pseudocholinesterase deficiency. .  She also  has a past surgical history that includes fracture tx (Left, 2000); carpal tunnel release (Left, 2/2013); knee arthroscopy (Right); orthopaedic (age 45); and orthopaedic (age 35).   Current Medications:   Current Outpatient Prescriptions on File Prior to Encounter   Medication Sig Dispense Refill    omeprazole (PRILOSEC) 20 mg capsule Take 20 mg by mouth nightly.  naproxen sodium 220 mg cap Take 2 Caps by mouth as needed. Indications: hold until after surgery. Last dose was 6-15-16      traMADol (ULTRAM) 50 mg tablet Take 50 mg by mouth every six (6) hours as needed.  busPIRone (BUSPAR) 15 mg tablet Take 15 mg by mouth nightly. Helps with TMJ pain       No current facility-administered medications on file prior to encounter. Date Last Reviewed: 1/30/2017  Social History/Home Situation:       Work/Activity History: The patient works at [employer]. OBJECTIVE:    Outcome Measures: Tool Used: Disabilities of the Arm, Shoulder and Hand (DASH) Questionnaire - Quick Version  Score:  Initial: 43/55  Most Recent: X/55 (Date: -- )   Interpretation of Score: The DASH is designed to measure the activities of daily living in person's with upper extremity dysfunction or pain. Each section is scored on a 1-5 scale, 5 representing the greatest disability. The scores of each section are added together for a total score of 55. Score 11 12-19 20-28 29-37 38-45 46-54 55   Modifier CH CI CJ CK CL CM CN     ? Carrying, Moving, and Handling Objects:     - CURRENT STATUS: CL - 60%-79% impaired, limited or restricted    - GOAL STATUS:  CK - 40%-59% impaired, limited or restricted    - D/C STATUS:  ---------------To be determined---------------  Range of Motion     AROM:       Right little finger motion is as follows: MP HE/35, PIP 0/95, DIP 0/60 degrees. Tip to distal oconnor crease 2 Cm. Strength:  STRENGTH: Right 40 lbs. Left 81 lbs. LAT PINCH: Right 19 lbs. Left 16 lbs.             Sensation:  Intact  ·              __________________________________________________________________________________________________  Treatment Assessment:  Patients tolerated treatment well with no complications. Upon completion of treatment, skin condition was normal.    Progression/Medical Necessity:   · Patient is expected to demonstrate progress in strength and range of motion to increase independence with ADL,household and work activities. .  Compliance with Program/Exercises: compliant most of the time. Reason for Continuation of Services/Other Comments:  ·   Recommendations/Intent for next treatment session: \"To discharge at this time. \"      Regarding Natalie Wick's therapy, I certify that the treatment plan above will be carried out by a therapist or under their direction.   Thank you for this referral,  Angelica Sood OT

## 2017-09-13 ENCOUNTER — ANESTHESIA EVENT (OUTPATIENT)
Dept: SURGERY | Age: 57
End: 2017-09-13
Payer: COMMERCIAL

## 2017-09-14 ENCOUNTER — HOSPITAL ENCOUNTER (OUTPATIENT)
Age: 57
Setting detail: OUTPATIENT SURGERY
Discharge: HOME | End: 2017-09-14
Attending: ORTHOPAEDIC SURGERY | Admitting: ORTHOPAEDIC SURGERY
Payer: COMMERCIAL

## 2017-09-14 ENCOUNTER — ANESTHESIA (OUTPATIENT)
Dept: SURGERY | Age: 57
End: 2017-09-14
Payer: COMMERCIAL

## 2017-09-14 VITALS
WEIGHT: 169 LBS | TEMPERATURE: 97.5 F | SYSTOLIC BLOOD PRESSURE: 145 MMHG | BODY MASS INDEX: 25.32 KG/M2 | DIASTOLIC BLOOD PRESSURE: 80 MMHG | RESPIRATION RATE: 15 BRPM | HEART RATE: 54 BPM | OXYGEN SATURATION: 97 %

## 2017-09-14 PROCEDURE — 77030011884 HC TAPE CST PLSTR BSNM -A: Performed by: ORTHOPAEDIC SURGERY

## 2017-09-14 PROCEDURE — 76060000032 HC ANESTHESIA 0.5 TO 1 HR: Performed by: ORTHOPAEDIC SURGERY

## 2017-09-14 PROCEDURE — 77030018836 HC SOL IRR NACL ICUM -A: Performed by: ORTHOPAEDIC SURGERY

## 2017-09-14 PROCEDURE — 74011250636 HC RX REV CODE- 250/636: Performed by: ORTHOPAEDIC SURGERY

## 2017-09-14 PROCEDURE — 77030000032 HC CUF TRNQT ZIMM -B: Performed by: ORTHOPAEDIC SURGERY

## 2017-09-14 PROCEDURE — 77030011640 HC PAD GRND REM COVD -A: Performed by: ORTHOPAEDIC SURGERY

## 2017-09-14 PROCEDURE — 74011250636 HC RX REV CODE- 250/636

## 2017-09-14 PROCEDURE — A4565 SLINGS: HCPCS | Performed by: ORTHOPAEDIC SURGERY

## 2017-09-14 PROCEDURE — 77030008367 HC SPLNT ORTHGLS BSNM -A: Performed by: ORTHOPAEDIC SURGERY

## 2017-09-14 PROCEDURE — 76210000063 HC OR PH I REC FIRST 0.5 HR: Performed by: ORTHOPAEDIC SURGERY

## 2017-09-14 PROCEDURE — 76010000138 HC OR TIME 0.5 TO 1 HR: Performed by: ORTHOPAEDIC SURGERY

## 2017-09-14 PROCEDURE — 76210000021 HC REC RM PH II 0.5 TO 1 HR: Performed by: ORTHOPAEDIC SURGERY

## 2017-09-14 PROCEDURE — 74011000250 HC RX REV CODE- 250

## 2017-09-14 PROCEDURE — 77030010507 HC ADH SKN DERMBND J&J -B: Performed by: ORTHOPAEDIC SURGERY

## 2017-09-14 PROCEDURE — 74011000250 HC RX REV CODE- 250: Performed by: ORTHOPAEDIC SURGERY

## 2017-09-14 PROCEDURE — 77030002986 HC SUT PROL J&J -A: Performed by: ORTHOPAEDIC SURGERY

## 2017-09-14 PROCEDURE — 77030002933 HC SUT MCRYL J&J -A: Performed by: ORTHOPAEDIC SURGERY

## 2017-09-14 RX ORDER — DEXAMETHASONE SODIUM PHOSPHATE 4 MG/ML
INJECTION, SOLUTION INTRA-ARTICULAR; INTRALESIONAL; INTRAMUSCULAR; INTRAVENOUS; SOFT TISSUE AS NEEDED
Status: DISCONTINUED | OUTPATIENT
Start: 2017-09-14 | End: 2017-09-14 | Stop reason: HOSPADM

## 2017-09-14 RX ORDER — OXYCODONE HYDROCHLORIDE 5 MG/1
5 TABLET ORAL
Status: DISCONTINUED | OUTPATIENT
Start: 2017-09-14 | End: 2017-09-14 | Stop reason: HOSPADM

## 2017-09-14 RX ORDER — BUPIVACAINE HYDROCHLORIDE 5 MG/ML
INJECTION, SOLUTION EPIDURAL; INTRACAUDAL AS NEEDED
Status: DISCONTINUED | OUTPATIENT
Start: 2017-09-14 | End: 2017-09-14 | Stop reason: HOSPADM

## 2017-09-14 RX ORDER — MIDAZOLAM HYDROCHLORIDE 1 MG/ML
2 INJECTION, SOLUTION INTRAMUSCULAR; INTRAVENOUS
Status: DISCONTINUED | OUTPATIENT
Start: 2017-09-14 | End: 2017-09-14 | Stop reason: HOSPADM

## 2017-09-14 RX ORDER — LIDOCAINE HYDROCHLORIDE 5 MG/ML
INJECTION, SOLUTION INFILTRATION; INTRAVENOUS
Status: DISCONTINUED | OUTPATIENT
Start: 2017-09-14 | End: 2017-09-14

## 2017-09-14 RX ORDER — PROPOFOL 10 MG/ML
INJECTION, EMULSION INTRAVENOUS
Status: DISCONTINUED | OUTPATIENT
Start: 2017-09-14 | End: 2017-09-14 | Stop reason: HOSPADM

## 2017-09-14 RX ORDER — SODIUM CHLORIDE 0.9 % (FLUSH) 0.9 %
5-10 SYRINGE (ML) INJECTION AS NEEDED
Status: DISCONTINUED | OUTPATIENT
Start: 2017-09-14 | End: 2017-09-14 | Stop reason: HOSPADM

## 2017-09-14 RX ORDER — NALBUPHINE HYDROCHLORIDE 20 MG/ML
5 INJECTION, SOLUTION INTRAMUSCULAR; INTRAVENOUS; SUBCUTANEOUS
Status: DISCONTINUED | OUTPATIENT
Start: 2017-09-14 | End: 2017-09-14 | Stop reason: HOSPADM

## 2017-09-14 RX ORDER — SODIUM CHLORIDE, SODIUM LACTATE, POTASSIUM CHLORIDE, CALCIUM CHLORIDE 600; 310; 30; 20 MG/100ML; MG/100ML; MG/100ML; MG/100ML
100 INJECTION, SOLUTION INTRAVENOUS CONTINUOUS
Status: DISCONTINUED | OUTPATIENT
Start: 2017-09-14 | End: 2017-09-14 | Stop reason: HOSPADM

## 2017-09-14 RX ORDER — MIDAZOLAM HYDROCHLORIDE 1 MG/ML
INJECTION, SOLUTION INTRAMUSCULAR; INTRAVENOUS AS NEEDED
Status: DISCONTINUED | OUTPATIENT
Start: 2017-09-14 | End: 2017-09-14 | Stop reason: HOSPADM

## 2017-09-14 RX ORDER — NALOXONE HYDROCHLORIDE 0.4 MG/ML
0.1 INJECTION, SOLUTION INTRAMUSCULAR; INTRAVENOUS; SUBCUTANEOUS
Status: DISCONTINUED | OUTPATIENT
Start: 2017-09-14 | End: 2017-09-14 | Stop reason: HOSPADM

## 2017-09-14 RX ORDER — LIDOCAINE HYDROCHLORIDE 5 MG/ML
INJECTION, SOLUTION INFILTRATION; INTRAVENOUS AS NEEDED
Status: DISCONTINUED | OUTPATIENT
Start: 2017-09-14 | End: 2017-09-14 | Stop reason: HOSPADM

## 2017-09-14 RX ORDER — LIDOCAINE HYDROCHLORIDE 10 MG/ML
0.1 INJECTION INFILTRATION; PERINEURAL AS NEEDED
Status: DISCONTINUED | OUTPATIENT
Start: 2017-09-14 | End: 2017-09-14 | Stop reason: HOSPADM

## 2017-09-14 RX ORDER — SODIUM CHLORIDE 0.9 % (FLUSH) 0.9 %
5-10 SYRINGE (ML) INJECTION EVERY 8 HOURS
Status: DISCONTINUED | OUTPATIENT
Start: 2017-09-14 | End: 2017-09-14 | Stop reason: HOSPADM

## 2017-09-14 RX ORDER — LIDOCAINE HYDROCHLORIDE 10 MG/ML
INJECTION INFILTRATION; PERINEURAL AS NEEDED
Status: DISCONTINUED | OUTPATIENT
Start: 2017-09-14 | End: 2017-09-14 | Stop reason: HOSPADM

## 2017-09-14 RX ORDER — CEFAZOLIN SODIUM IN 0.9 % NACL 2 G/50 ML
2 INTRAVENOUS SOLUTION, PIGGYBACK (ML) INTRAVENOUS ONCE
Status: COMPLETED | OUTPATIENT
Start: 2017-09-14 | End: 2017-09-14

## 2017-09-14 RX ORDER — ONDANSETRON 2 MG/ML
INJECTION INTRAMUSCULAR; INTRAVENOUS AS NEEDED
Status: DISCONTINUED | OUTPATIENT
Start: 2017-09-14 | End: 2017-09-14 | Stop reason: HOSPADM

## 2017-09-14 RX ORDER — FLUMAZENIL 0.1 MG/ML
0.2 INJECTION INTRAVENOUS
Status: DISCONTINUED | OUTPATIENT
Start: 2017-09-14 | End: 2017-09-14 | Stop reason: HOSPADM

## 2017-09-14 RX ORDER — FENTANYL CITRATE 50 UG/ML
INJECTION, SOLUTION INTRAMUSCULAR; INTRAVENOUS AS NEEDED
Status: DISCONTINUED | OUTPATIENT
Start: 2017-09-14 | End: 2017-09-14 | Stop reason: HOSPADM

## 2017-09-14 RX ORDER — HYDROMORPHONE HYDROCHLORIDE 2 MG/ML
0.5 INJECTION, SOLUTION INTRAMUSCULAR; INTRAVENOUS; SUBCUTANEOUS
Status: DISCONTINUED | OUTPATIENT
Start: 2017-09-14 | End: 2017-09-14 | Stop reason: HOSPADM

## 2017-09-14 RX ADMIN — FENTANYL CITRATE 50 MCG: 50 INJECTION, SOLUTION INTRAMUSCULAR; INTRAVENOUS at 08:28

## 2017-09-14 RX ADMIN — LIDOCAINE HYDROCHLORIDE 40 ML: 5 INJECTION, SOLUTION INFILTRATION; INTRAVENOUS at 08:18

## 2017-09-14 RX ADMIN — CEFAZOLIN 2 G: 1 INJECTION, POWDER, FOR SOLUTION INTRAMUSCULAR; INTRAVENOUS; PARENTERAL at 08:07

## 2017-09-14 RX ADMIN — FENTANYL CITRATE 50 MCG: 50 INJECTION, SOLUTION INTRAMUSCULAR; INTRAVENOUS at 08:32

## 2017-09-14 RX ADMIN — MIDAZOLAM HYDROCHLORIDE 2 MG: 1 INJECTION, SOLUTION INTRAMUSCULAR; INTRAVENOUS at 08:13

## 2017-09-14 RX ADMIN — DEXAMETHASONE SODIUM PHOSPHATE 4 MG: 4 INJECTION, SOLUTION INTRA-ARTICULAR; INTRALESIONAL; INTRAMUSCULAR; INTRAVENOUS; SOFT TISSUE at 08:29

## 2017-09-14 RX ADMIN — ONDANSETRON 4 MG: 2 INJECTION INTRAMUSCULAR; INTRAVENOUS at 08:29

## 2017-09-14 RX ADMIN — PROPOFOL 50 MCG/KG/MIN: 10 INJECTION, EMULSION INTRAVENOUS at 08:20

## 2017-09-14 NOTE — DISCHARGE INSTRUCTIONS
Keep splint clean, dry and intact until seen in office  T Move fingers, elevate, and ice to prevent swelling. No heavy lifting. TYPICAL SIDE EFFECTS OF PAIN MEDICATION:  *    Constipation: Drink lots of fluids, try prune juice. OTC stool softener if needed. *    Nausea: Take pain medication with food. ACTIVITY  · As tolerated and as directed by your doctor. · Bathe or shower as directed by your doctor. DIET  · Day of surgery: Clear liquids until no nausea or vomiting; small portion, light diet [place] foods (ex: baked chicken, plain rice, grits, scrambled eggs, toast). Nothing greasy, fried or spicy today. · Advance to regular diet on second day, unless your doctor orders otherwise. · If nausea and vomiting continues, call your doctor. PAIN  · Take pain medication as directed by your doctor. · DO NOT take aspirin or blood thinners unless directed by your doctor. CALL YOUR DOCTOR IF    s Call your doctor if pain is NOT relieved by medication.   s Excessive bleeding that does not stop after holding pressure over the area  · Temperature of 101 degrees F or above  · Excessive redness, swelling or bruising, and/ or green or yellow, smelly discharge from incision    AFTER ANESTHESIA   · For the first 24 hours: DO NOT Drive, Drink alcoholic beverages, or Make important decisions. · Be aware of dizziness following anesthesia and while taking pain medication. DISCHARGE SUMMARY from Nurse    PATIENT INSTRUCTIONS:    After general anesthesia or intravenous sedation, for 24 hours or while taking prescription Narcotics:  · Limit your activities  · Do not drive and operate hazardous machinery  · Do not make important personal or business decisions  · Do  not drink alcoholic beverages  · If you have not urinated within 8 hours after discharge, please contact your surgeon on call. *  Please give a list of your current medications to your Primary Care Provider.     *  Please update this list whenever your medications are discontinued, doses are      changed, or new medications (including over-the-counter products) are added. *  Please carry medication information at all times in case of emergency situations. Preventing Infection at Home  We care about preventing infection and avoiding the spread of germs - not only when you are in the hospital but also when you return home. When you return home from the hospital, its important to take the following steps to help prevent infection and avoid spreading germs that could infect you and others. Ask everyone in your home to follow these guidelines, too. Clean Your Hands  · Clean your hands whenever your hands are visibly dirty, before you eat, before or after touching your mouth, nose or eyes, and before preparing food. Clean them after contact with body fluids, using the restroom, touching animals or changing diapers. · When washing hands, wet them with warm water and work up a lather. Rub hands for at least 15 seconds, then rinse them and pat them dry with a clean towel or paper towel. · When using hand sanitizers, it should take about 15 seconds to rub your hands dry. If not, you probably didnt apply enough . Cover Your Sneeze or Cough  Germs are released into the air whenever you sneeze or cough. To prevent the spread of infection:  · Turn away from other people before coughing or sneezing. · Cover your mouth or nose with a tissue when you cough or sneeze. Put the tissue in the trash. · If you dont have a tissue, cough or sneeze into your upper sleeve, not your hands. · Always clean your hands after coughing or sneezing. Care for Wounds  Your skin is your bodys first line of defense against germs, but an open wound leaves an easy way for germs to enter your body.  To prevent infection:  · Clean your hands before and after changing wound dressings, and wear gloves to change dressings if recommended by your doctor. · Take special care with IV lines or other devices inserted into the body. If you must touch them, clean your hands first.  · Follow any specific instructions from your doctor to care for your wounds. Contact your doctor if you experience any signs of infection, such as fever or increased redness at the surgical or wound site. Keep a Clean Home  · Clean or wipe commonly touched hard surfaces like door handles, sinks, tabletops, phones and TV remotes. · Use products labeled disinfectant to kill harmful bacteria and viruses. · Use a clean cloth or paper towel to clean and dry surfaces. Wiping surfaces with a dirty dishcloth, sponge or towel will only spread germs. · Never share toothbrushes, briggs, drinking glasses, utensils, razor blades, face cloths or bath towels to avoid spreading germs. · Be sure that the linens that you sleep on are clean. · Keep pets away from wounds and wash your hands after touching pets, their toys or bedding. We care about you and your health. Remember, preventing infections is a team effort between you, your family, friends and health care providers. These are general instructions for a healthy lifestyle:    No smoking/ No tobacco products/ Avoid exposure to second hand smoke    Surgeon General's Warning:  Quitting smoking now greatly reduces serious risk to your health. Obesity, smoking, and sedentary lifestyle greatly increases your risk for illness    A healthy diet, regular physical exercise & weight monitoring are important for maintaining a healthy lifestyle    You may be retaining fluid if you have a history of heart failure or if you experience any of the following symptoms:  Weight gain of 3 pounds or more overnight or 5 pounds in a week, increased swelling in our hands or feet or shortness of breath while lying flat in bed. Please call your doctor as soon as you notice any of these symptoms; do not wait until your next office visit.     Recognize signs and symptoms of STROKE:    F-face looks uneven    A-arms unable to move or move unevenly    S-speech slurred or non-existent    T-time-call 911 as soon as signs and symptoms begin-DO NOT go       Back to bed or wait to see if you get better-TIME IS BRAIN.

## 2017-09-14 NOTE — ANESTHESIA POSTPROCEDURE EVALUATION
Post-Anesthesia Evaluation and Assessment    Patient: Sydney Lizama MRN: [id number]  SSN: [id number]    YOB: 1960  Age: 59 y.o. Sex: female       Cardiovascular Function/Vital Signs  Visit Vitals    /82    Pulse (!) 51    Temp 36.4 °C (97.5 °F)    Resp 20    Wt 76.7 kg (169 lb)    SpO2 97%    BMI 25.32 kg/m2       Patient is status post regional anesthesia for Procedure(s):  RIGHT SMALL MP JOINT CAPSULECTOMY. Nausea/Vomiting: None    Postoperative hydration reviewed and adequate. Pain:  Pain Scale 1: Numeric (0 - 10) (09/14/17 0851)  Pain Intensity 1: 0 (09/14/17 0851)   Managed    Neurological Status:   Neuro (WDL): Within Defined Limits (drowsy post op) (09/14/17 0851)   At baseline    Mental Status and Level of Consciousness: Arousable    Pulmonary Status:   O2 Device: Room air (09/14/17 0857)   Adequate oxygenation and airway patent    Complications related to anesthesia: None    Post-anesthesia assessment completed.  No concerns    Signed By: Pamela Mcneil MD     September 14, 2017

## 2017-09-14 NOTE — BRIEF OP NOTE
BRIEF OPERATIVE NOTE    Date of Procedure: 9/14/2017   Preoperative Diagnosis: Contracture of joint of finger of right hand [M24.541]  Postoperative Diagnosis: Contracture of joint of finger of right hand [M24.541]    Procedure(s):  RIGHT SMALL MP JOINT CAPSULECTOMY  Surgeon(s) and Role:     * Jennifer Ho MD - Primary         Assistant Staff:       Surgical Staff:  Circ-1: Abdul Eisenmenger, RN  Scrub Tech-1: Karolina Adamson  Event Time In   Incision Start 0827   Incision Close 4047     Anesthesia: MAC   Estimated Blood Loss: MINIMAL  Specimens: * No specimens in log *   Findings: MINIMAL   Complications: SEE DICTATION  Implants: * No implants in log *

## 2017-09-14 NOTE — IP AVS SNAPSHOT
[address] [facility] 
[phone] Patient: Nestor Masters MRN: [id number] KKR:[id number]/[id number] You are allergic to the following Allergen Reactions Penicillins Rash Recent Documentation Weight BMI OB Status Smoking Status 76.7 kg 25.32 kg/m2 Postmenopausal Never Smoker Emergency Contacts Name Discharge Info Relation Home Work Mobile [address] CAREGIVER [3] Spouse [3] [phone] About your hospitalization You were admitted on:  September 14, 2017 You last received care in the:  [facility] OP PACU You were discharged on:  September 14, 2017 Unit phone number:  [phone] Why you were hospitalized Your primary diagnosis was:  Not on File Providers Seen During Your Hospitalizations Provider Role Specialty Primary office phone Rufus Farr MD Attending Provider Orthopedic Surgery [phone] Your Primary Care Physician (PCP) Primary Care Physician Office Phone Office Fax [facility] [phone] Follow-up Information Follow up With Details Comments Contact Info Rufus Farr MD Follow up on 9/26/2017 2:[address] [address] 
[phone] Merline Horning, [address] [place] [address] [address] [place] [address] 
[phone] Current Discharge Medication List  
  
CONTINUE these medications which have NOT CHANGED Dose & Instructions Dispensing Information Comments Morning Noon Evening Bedtime  
 busPIRone 15 mg tablet Commonly known as:  BUSPAR Your last dose was: Your next dose is:    
   
   
 Dose:  15 mg Take 15 mg by mouth nightly. Helps with TMJ pain Refills:  0  
     
   
   
   
  
 omeprazole 20 mg capsule Commonly known as:  PRILOSEC Your last dose was: Your next dose is: Dose:  20 mg Take 20 mg by mouth nightly. Refills:  0  
     
   
   
   
  
 traMADol 50 mg tablet Commonly known as:  ULTRAM  
   
Your last dose was: Your next dose is:    
   
   
 Dose:  50 mg Take 50 mg by mouth every six (6) hours as needed. Refills:  0 Discharge Instructions Keep splint clean, dry and intact until seen in office  T Move fingers, elevate, and ice to prevent swelling. No heavy lifting. TYPICAL SIDE EFFECTS OF PAIN MEDICATION: 
*    Constipation: Drink lots of fluids, try prune juice. OTC stool softener if needed. *    Nausea: Take pain medication with food. ACTIVITY · As tolerated and as directed by your doctor. · Bathe or shower as directed by your doctor. DIET 
· Day of surgery: Clear liquids until no nausea or vomiting; small portion, light diet [place] foods (ex: baked chicken, plain rice, grits, scrambled eggs, toast). Nothing greasy, fried or spicy today. · Advance to regular diet on second day, unless your doctor orders otherwise. · If nausea and vomiting continues, call your doctor. PAIN 
· Take pain medication as directed by your doctor. · DO NOT take aspirin or blood thinners unless directed by your doctor. CALL YOUR DOCTOR IF   
s Call your doctor if pain is NOT relieved by medication.  
s Excessive bleeding that does not stop after holding pressure over the area · Temperature of 101 degrees F or above · Excessive redness, swelling or bruising, and/ or green or yellow, smelly discharge from incision AFTER ANESTHESIA · For the first 24 hours: DO NOT Drive, Drink alcoholic beverages, or Make important decisions. · Be aware of dizziness following anesthesia and while taking pain medication. DISCHARGE SUMMARY from Nurse PATIENT INSTRUCTIONS: 
 
After general anesthesia or intravenous sedation, for 24 hours or while taking prescription Narcotics: · Limit your activities · Do not drive and operate hazardous machinery · Do not make important personal or business decisions · Do  not drink alcoholic beverages · If you have not urinated within 8 hours after discharge, please contact your surgeon on call. *  Please give a list of your current medications to your Primary Care Provider. *  Please update this list whenever your medications are discontinued, doses are 
    changed, or new medications (including over-the-counter products) are added. *  Please carry medication information at all times in case of emergency situations. Preventing Infection at Home We care about preventing infection and avoiding the spread of germs  not only when you are in the hospital but also when you return home. When you return home from the hospital, its important to take the following steps to help prevent infection and avoid spreading germs that could infect you and others. Ask everyone in your home to follow these guidelines, too. Clean Your Hands · Clean your hands whenever your hands are visibly dirty, before you eat, before or after touching your mouth, nose or eyes, and before preparing food. Clean them after contact with body fluids, using the restroom, touching animals or changing diapers. · When washing hands, wet them with warm water and work up a lather. Rub hands for at least 15 seconds, then rinse them and pat them dry with a clean towel or paper towel. · When using hand sanitizers, it should take about 15 seconds to rub your hands dry. If not, you probably didnt apply enough . Cover Your Sneeze or Cough Germs are released into the air whenever you sneeze or cough. To prevent the spread of infection: · Turn away from other people before coughing or sneezing. · Cover your mouth or nose with a tissue when you cough or sneeze. Put the tissue in the trash. · If you dont have a tissue, cough or sneeze into your upper sleeve, not your hands. · Always clean your hands after coughing or sneezing. Care for Wounds Your skin is your bodys first line of defense against germs, but an open wound leaves an easy way for germs to enter your body. To prevent infection: · Clean your hands before and after changing wound dressings, and wear gloves to change dressings if recommended by your doctor. · Take special care with IV lines or other devices inserted into the body. If you must touch them, clean your hands first. 
· Follow any specific instructions from your doctor to care for your wounds. Contact your doctor if you experience any signs of infection, such as fever or increased redness at the surgical or wound site. Keep a [address] [address] · Clean or wipe commonly touched hard surfaces like door handles, sinks, tabletops, phones and TV remotes. · Use products labeled disinfectant to kill harmful bacteria and viruses. · Use a clean cloth or paper towel to clean and dry surfaces. Wiping surfaces with a dirty dishcloth, sponge or towel will only spread germs. · Never share toothbrushes, briggs, drinking glasses, utensils, razor blades, face cloths or bath towels to avoid spreading germs. · Be sure that the linens that you sleep on are clean. · Keep pets away from wounds and wash your hands after touching pets, their toys or bedding. We care about you and your health. Remember, preventing infections is a team effort between you, your family, friends and health care providers. These are general instructions for a healthy lifestyle: No smoking/ No tobacco products/ Avoid exposure to second hand smoke Surgeon General's Warning:  Quitting smoking now greatly reduces serious risk to your health. Obesity, smoking, and sedentary lifestyle greatly increases your risk for illness A healthy diet, regular physical exercise & weight monitoring are important for maintaining a healthy lifestyle You may be retaining fluid if you have a history of heart failure or if you experience any of the following symptoms:  Weight gain of 3 pounds or more overnight or 5 pounds in a week, increased swelling in our hands or feet or shortness of breath while lying flat in bed. Please call your doctor as soon as you notice any of these symptoms; do not wait until your next office visit. Recognize signs and symptoms of STROKE: 
 
F-face looks uneven A-arms unable to move or move unevenly S-speech slurred or non-existent T-time-call 911 as soon as signs and symptoms begin-DO NOT go Back to bed or wait to see if you get better-TIME IS BRAIN. Discharge Orders None [facility]! Dear Ana Marcus: Thank you for requesting a [employer] account. Our records indicate that you already have an active [employer] account. You can access your account anytime at [email or web address] Did you know that you can access your hospital and ER discharge instructions at any time in [employer] You can also review all of your test results from your hospital stay or ER visit. Additional Information If you have questions, please visit the Frequently Asked Questions section of the [employer] website at [email or web address] Remember, [employer] is NOT to be used for urgent needs. For medical emergencies, dial 911. Now available from your iPhone and Android! General Information Please provide this summary of care documentation to your next provider. Patient Signature:  ____________________________________________________________ Date:  ____________________________________________________________  
  
Paul Espinal Provider Signature:  ____________________________________________________________ Date:  ____________________________________________________________

## 2017-09-14 NOTE — ANESTHESIA PREPROCEDURE EVALUATION
Anesthetic History   No history of anesthetic complications            Review of Systems / Medical History  Patient summary reviewed and pertinent labs reviewed    Pulmonary  Within defined limits                 Neuro/Psych   Within defined limits           Cardiovascular                  Exercise tolerance: >4 METS     GI/Hepatic/Renal     GERD: well controlled           Endo/Other  Within defined limits           Other Findings              Physical Exam    Airway  Mallampati: II  TM Distance: > 6 cm  Neck ROM: normal range of motion   Mouth opening: Normal     Cardiovascular  Regular rate and rhythm,  S1 and S2 normal,  no murmur, click, rub, or gallop             Dental  No notable dental hx       Pulmonary  Breath sounds clear to auscultation               Abdominal         Other Findings            Anesthetic Plan    ASA: 2  Anesthesia type: regional - Rema block          Induction: Intravenous  Anesthetic plan and risks discussed with: Patient and Spouse

## 2017-09-14 NOTE — PERIOP NOTES
Discharge instructions given to spouse. Verbalized understanding and opportunity for questions was given. Dr Edison Garcia okay to discharge at this time. Pt and belongings taken via wheelchair to car.

## 2017-09-15 NOTE — OP NOTES
[address]   OPERATIVE REPORT       Name:  Stef Avilez   MR#:  [id number]   :  1960   Account #:  [de-identified]   Date of Adm:  2017       DATE OF SURGERY: 2017    PREOPERATIVE DIAGNOSIS: Right small metacarpophalangeal joint   contracture. POSTOPERATIVE DIAGNOSIS: Right small metacarpophalangeal joint   contracture. PROCEDURE: Right small metacarpophalangeal joint dorsal   capsulectomy. SURGEON: Sadia Cao MD    ANESTHESIA: Rema block with MAC.      EBL/IV FLUIDS: Per Anesthesia. COMPLICATIONS: None. DISPOSITION: Stable to recovery room. INDICATIONS FOR PROCEDURE: The patient has had a right small MP   joint contracture, has failed conservative measures. She has   regained some of intrinsic strength, so we discussed proceeding   with a formal capsulectomy and manipulation. Risks and benefits   of the procedure were discussed with her including, but not   limited to bleeding, infection, injury to adjacent structures,   need for additional procedures, wound dehiscence, scar   formation, incomplete resolution of symptoms, recurrence of   symptoms, decreased range of motion, stiffness and pain, as well   as the anesthetic risk. PROCEDURE IN DETAIL: The patient was seen and marked in the   preoperative suite. She was taken back to the OR, placed on the   table in supine position. She underwent a [place] block to the   right upper extremity and the right upper extremity was prepped   and draped in standard sterile fashion. A formal time-out was   performed confirming patient identification, preoperative   antibiotics as well as planned operative procedure. We made a   standard dorsal incision overlying the right long small MP   joint, excised the dorsal capsule, released some the collateral   ligament of the MP joint of the small, manipulated her fingers. We were able to get full flexion at the MP and PIP joints.  We   irrigated copiously with normal saline, closed with running   subcuticular Monocryl and Dermabond glue. Soft sterile dressing   was placed. Tourniquet was let down. Fingers pinked up nicely at   the end of the procedure. The patient was taken to the recovery   room having tolerated the procedure well. POSTOPERATIVE CARE: Early motion. No heavy lifting. Follow up in   2 weeks for suture removal. We will get her transitioned into a   removable splint to work on range of motion to keep her fingers   in the MP joint in a safe position.         MD DARRIN Stephenson / Breanna Avila   D:  09/15/2017   14:14   T:  09/15/2017   14:46   Job #:  [id number]

## 2017-09-26 ENCOUNTER — HOSPITAL ENCOUNTER (OUTPATIENT)
Dept: PHYSICAL THERAPY | Age: 57
Discharge: HOME | End: 2017-09-26
Payer: COMMERCIAL

## 2017-09-26 PROCEDURE — 97165 OT EVAL LOW COMPLEX 30 MIN: CPT

## 2017-09-27 NOTE — PROGRESS NOTES
Jacque Maida  : 1960 [facility] [place] [address], [place] [address].  [phone]   [phone]         OUTPATIENT OCCUPATIONAL THERAPY: Initial Assessment 2017    ICD-10: Treatment Diagnosis: Stiffness of right hand, not elsewhere classified (M25.641)Pain in right hand (M79.641)  Precautions/Allergies:   Penicillins   Fall Risk Score: 0 (? 5 = High Risk)  MD Orders: Evaluate and treat: ulnar gutter with MP's at least 70 degrees, hand based. 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 Nayely Lomeli presents with decreased functional use, strength and range of motion of her right Little finger MCP joint dorsal aspect and PIP joint dorsal aspect. and upper extremity that is affecting her independence with activities of daily living and ability to perform job tasks. I feel that Ms. Nayely Lomeli will benefit from skilled occupational therapy to maximize the functional use of her Little finger MCP joint dorsal aspect. and upper extremity in daily activities and work tasks. PLAN OF CARE:   PROBLEM LIST:  1. Pain in right little finger. 2. Decreased motion in right little finger. 3. Decreased strength in right hand. INTERVENTIONS PLANNED:  1. Modalities that may include fluidotherapy, paraffin, ultrasound, and light therapy. 2. Therapeutic exercise including a home exercise program.  3. Manual therapy. 4. Therapeutic activities. TREATMENT PLAN:  Effective Dates: 2017 TO 2017. Frequency/Duration: 1-2 times a week for 10 weeks  GOALS: (Goals have been discussed and agreed upon with patient.)  Short-Term Functional Goals: Time Frame: 4 weeks  1. Decrease pain to 5 to allow patient to perform self care tasks.   2. Increase motion in right little finger by 15 degrees to improve functional use of upper extremity in ADL activities. 3. Increase strength in right hand by 10 pounds to allow patient to  and lift objects during self care activities. Discharge Goals: Time Frame: 10 weeks  1. Decrease pain to 2 to allow patient to perform all household and work tasks. 2. Increase motion in right little finger by 30 degreees to allow patient to perform all ADL activities. 3. Increase strength in right hand by 15 pounds to allow patient to , lift, hold, and carry heavy objects. Rehabilitation Potential For Stated Goals: Good  Regarding Natalie MILLER Delano's therapy, I certify that the treatment plan above will be carried out by a therapist or under their direction. Thank you for this referral,  Hunter Parrish OT       Referring Physician Signature: Paul Arzola MD _________________________  Date _________            The information in this section was collected on 9/26/2017 (except where otherwise noted). OCCUPATIONAL PROFILE & HISTORY:   History of Present Injury/Illness (Reason for Referral): The patient had developed a contracture in her right little finger  Past Medical History/Comorbidities:   Ms. Anna Du  has a past medical history of Anemia; GERD (gastroesophageal reflux disease); and TMJ (temporomandibular joint syndrome). She also has no past medical history of Adverse effect of anesthesia; Difficult intubation; Malignant hyperthermia due to anesthesia; Nausea & vomiting; or Pseudocholinesterase deficiency. Ms. Anna Du  has a past surgical history that includes fracture tx (Left, 2000); carpal tunnel release (Left, 2/2013); knee arthroscopy (Right); orthopaedic (age 45); orthopaedic (age 35); and orthopaedic (Right).   Social History/Living Environment:     private residence  Prior Level of Function/Work/Activity:  independent  Dominant Side:         RIGHT    Current Medications:    Current Outpatient Prescriptions:     omeprazole (PRILOSEC) 20 mg capsule, Take 20 mg by mouth nightly., Disp: , Rfl:     traMADol (ULTRAM) 50 mg tablet, Take 50 mg by mouth every six (6) hours as needed. , Disp: , Rfl:     busPIRone (BUSPAR) 15 mg tablet, Take 15 mg by mouth nightly. Helps with TMJ pain, Disp: , Rfl:    Date Last Reviewed:  9/26/2017   Number of medical conditions (excluding presenting problem) that affect the Plan of Care: Brief history (0):  LOW COMPLEXITY   ASSESSMENT OF OCCUPATIONAL PERFORMANCE:   RANGE OF MOTION:     · AROM: Right little finger motion is as follows: MP HE/10, PIP -35/75, DIP -15/45 degrees. STRENGTH:  Not tested yet            SENSATION:  intact           Physical Skills Involved:  1. Range of Motion  2. Strength  3. Pain (acute) Cognitive Skills Affected (resulting in the inability to perform in a timely and safe manner): 1. none Psychosocial Skills Affected:  1. none   Number of elements that affect the Plan of Care: 1-3:  LOW COMPLEXITY   CLINICAL DECISION MAKING:   Outcome Measure: Tool Used: Disabilities of the Arm, Shoulder and Hand (DASH) Questionnaire - Quick Version  Score:  Initial: 33/55  Most Recent: X/55 (Date: -- )   Interpretation of Score: The DASH is designed to measure the activities of daily living in person's with upper extremity dysfunction or pain. Each section is scored on a 1-5 scale, 5 representing the greatest disability. The scores of each section are added together for a total score of 55. Score 11 12-19 20-28 29-37 38-45 46-54 55   Modifier CH CI CJ CK CL CM CN     ?  Carrying, Moving, and Handling Objects:     - CURRENT STATUS: CK - 40%-59% impaired, limited or restricted    - GOAL STATUS: CJ - 20%-39% impaired, limited or restricted    - D/C STATUS:  ---------------To be determined---------------      Medical Necessity:   · Patient is expected to demonstrate progress in strength and range of motion to decrease assistance required with ADL,household and work activities. .  Reason for Services/Other Comments:  · Patient continues to require skilled intervention due to limited motion,strength and function in her right hand. .  Clinical Decision-Making Assessment:     Clinical Decision-Making: LOW COMPLEXITY   TREATMENT:   (In addition to Assessment/Re-Assessment sessions the following treatments were rendered)  Pre-treatment Symptoms/Complaints:  Pain and stiffness in right hand and stiffness in right little finger. Pain: Initial: Pain Intensity 1: 3 (increasing to 10 with PROM)  Pain Location 1: Hand (little finger)  Pain Orientation 1: Right  Post Session:  3     Patient was provided with a fabricated splint with her MP joints at maximun flexion. Treatment/Session Assessment:    · Response to Treatment:  Patients tolerated treatment well with no complications. Upon completion of treatment, skin condition was normal..  · Compliance with Program/Exercises: Will assess as treatment progresses. · Recommendations/Intent for next treatment session: \"Next visit will focus on advancements to more challenging activities\".   Total Treatment Duration:  OT Patient Time In/Time Out  Time In: 0315  Time Out: 8479 [facility], OT

## 2017-09-27 NOTE — PROGRESS NOTES
Ambulatory/Rehab Services H2 Model Falls Risk Assessment    Risk Factor Pts. ·   Confusion/Disorientation/Impulsivity  []    4 ·   Symptomatic Depression  []   2 ·   Altered Elimination  []   1 ·   Dizziness/Vertigo  []   1 ·   Gender (Male)  []   1 ·   Any administered antiepileptics (anticonvulsants):  []   2 ·   Any administered benzodiazepines:  []   1 ·   Visual Impairment (specify):  []   1 ·   Portable Oxygen Use  []   1 ·   Orthostatic ? BP  []   1 ·   History of Recent Falls (within 3 mos.)  []   5     Ability to Rise from Chair (choose one) Pts. ·   Ability to rise in a single movement  [x]   0 ·   Pushes up, successful in one attempt  []   1 ·   Multiple attempts, but successful  []   3 ·   Unable to rise without assistance  []   4   Total: (5 or greater = High Risk) 0     Falls Prevention Plan:   []                Physical Limitations to Exercise (specify):   []                Mobility Assistance Device (type):   []                Exercise/Equipment Adaptation (specify):    ©2010 [facility] [address] Patent #[id number].  Federal Law prohibits the replication, distribution or use without written permission from [facility] [employer]

## 2017-10-05 ENCOUNTER — HOSPITAL ENCOUNTER (OUTPATIENT)
Dept: PHYSICAL THERAPY | Age: 57
Discharge: HOME | End: 2017-10-05
Payer: COMMERCIAL

## 2017-10-05 PROCEDURE — 97140 MANUAL THERAPY 1/> REGIONS: CPT

## 2017-10-05 PROCEDURE — 97110 THERAPEUTIC EXERCISES: CPT

## 2017-10-05 PROCEDURE — 97022 WHIRLPOOL THERAPY: CPT

## 2017-10-05 NOTE — PROGRESS NOTES
Cassi Victor  : 1960 [facility] [place] [address], [address], [facility] [address]  [phone]   [phone]         OUTPATIENT OCCUPATIONAL THERAPY: Daily Note 10/5/2017     ICD-10: Treatment Diagnosis: Stiffness of right hand, not elsewhere classified (M25.641)Pain in right hand (M79.641)  Precautions/Allergies:   Penicillins   Fall Risk Score: 0 (? 5 = High Risk)  MD Orders: Evaluate and treat: ulnar gutter with MP's at least 70 degrees, hand based. 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 Marveen Homans presents with decreased functional use, strength and range of motion of her right Little finger MCP joint dorsal aspect and PIP joint dorsal aspect. and upper extremity that is affecting her independence with activities of daily living and ability to perform job tasks. I feel that Ms. Marveen Homans will benefit from skilled occupational therapy to maximize the functional use of her Little finger MCP joint dorsal aspect. and upper extremity in daily activities and work tasks. PLAN OF CARE:   PROBLEM LIST:  1. Pain in right little finger. 2. Decreased motion in right little finger. 3. Decreased strength in right hand. INTERVENTIONS PLANNED:  1. Modalities that may include fluidotherapy, paraffin, ultrasound, and light therapy. 2. Therapeutic exercise including a home exercise program.  3. Manual therapy. 4. Therapeutic activities. TREATMENT PLAN:  Effective Dates: 2017 TO 2017. Frequency/Duration: 1-2 times a week for 10 weeks  GOALS: (Goals have been discussed and agreed upon with patient.)  Short-Term Functional Goals: Time Frame: 4 weeks  1. Decrease pain to 5 to allow patient to perform self care tasks.   2. Increase motion in right little finger by 15 degrees to improve functional use of upper extremity in ADL activities. 3. Increase strength in right hand by 10 pounds to allow patient to  and lift objects during self care activities. Discharge Goals: Time Frame: 10 weeks  1. Decrease pain to 2 to allow patient to perform all household and work tasks. 2. Increase motion in right little finger by 30 degreees to allow patient to perform all ADL activities. 3. Increase strength in right hand by 15 pounds to allow patient to , lift, hold, and carry heavy objects. Rehabilitation Potential For Stated Goals: Good  Regarding Natalie Wick's therapy, I certify that the treatment plan above will be carried out by a therapist or under their direction. Thank you for this referral,  Sherren Infield, OT       Referring Physician Signature: Almetta Apgar, MD _________________________  Date _________            The information in this section was collected on 2017 (except where otherwise noted). OCCUPATIONAL PROFILE & HISTORY:   History of Present Injury/Illness (Reason for Referral): The patient had developed a contracture in her right little finger  Past Medical History/Comorbidities:   Ms. Giuliana Gonzales  has a past medical history of Anemia; GERD (gastroesophageal reflux disease); and TMJ (temporomandibular joint syndrome). She also has no past medical history of Adverse effect of anesthesia; Difficult intubation; Malignant hyperthermia due to anesthesia; Nausea & vomiting; or Pseudocholinesterase deficiency. Ms. Giuliana Gonzales  has a past surgical history that includes fracture tx (Left, ); carpal tunnel release (Left, 2013); knee arthroscopy (Right); orthopaedic (age 45); orthopaedic (age 35); and orthopaedic (Right).   Social History/Living Environment:     private residence  Prior Level of Function/Work/Activity:  independent  Dominant Side:         RIGHT    Current Medications:    Current Outpatient Prescriptions:     omeprazole (PRILOSEC) 20 mg capsule, Take 20 mg by mouth nightly., Disp: , Rfl:     traMADol (ULTRAM) 50 mg tablet, Take 50 mg by mouth every six (6) hours as needed. , Disp: , Rfl:     busPIRone (BUSPAR) 15 mg tablet, Take 15 mg by mouth nightly. Helps with TMJ pain, Disp: , Rfl:    Date Last Reviewed:  9/26/2017   Number of medical conditions (excluding presenting problem) that affect the Plan of Care: Brief history (0):  LOW COMPLEXITY   ASSESSMENT OF OCCUPATIONAL PERFORMANCE:   RANGE OF MOTION:     · AROM: Right little finger motion is as follows: MP HE/10, PIP -35/75, DIP -15/45 degrees. STRENGTH:  Not tested yet            SENSATION:  intact           Physical Skills Involved:  1. Range of Motion  2. Strength  3. Pain (acute) Cognitive Skills Affected (resulting in the inability to perform in a timely and safe manner): 1. none Psychosocial Skills Affected:  1. none   Number of elements that affect the Plan of Care: 1-3:  LOW COMPLEXITY   CLINICAL DECISION MAKING:   Outcome Measure: Tool Used: Disabilities of the Arm, Shoulder and Hand (DASH) Questionnaire - Quick Version  Score:  Initial: 33/55  Most Recent: X/55 (Date: -- )   Interpretation of Score: The DASH is designed to measure the activities of daily living in person's with upper extremity dysfunction or pain. Each section is scored on a 1-5 scale, 5 representing the greatest disability. The scores of each section are added together for a total score of 55. Score 11 12-19 20-28 29-37 38-45 46-54 55   Modifier CH CI CJ CK CL CM CN     ?  Carrying, Moving, and Handling Objects:     - CURRENT STATUS: CK - 40%-59% impaired, limited or restricted    - GOAL STATUS: CJ - 20%-39% impaired, limited or restricted    - D/C STATUS:  ---------------To be determined---------------      Medical Necessity:   · Patient is expected to demonstrate progress in strength and range of motion to decrease assistance required with ADL,household and work activities. .  Reason for Services/Other Comments:  · Patient continues to require skilled intervention due to limited motion,strength and function in her right hand. .  Clinical Decision-Making Assessment:     Clinical Decision-Making: LOW COMPLEXITY   TREATMENT:   (In addition to Assessment/Re-Assessment sessions the following treatments were rendered)  Pre-treatment Symptoms/Complaints:  Pain and stiffness in right hand and stiffness in right little finger. Pain: Initial: Pain Intensity 1: 1 (increasing to 8 with PROM)  Pain Location 1: Hand (little finger)  Pain Orientation 1: Right  Post Session:  2     Patient was provided with a fabricated splint with her MP joints at maximun flexion. Patient stated \"I feel like my finger is moving better\"    Manual Therapy: (Soft Tissue Mobilization Duration  Duration: 15 Minutes  Duration: 15 Minutes): Technique: Retrograde massage (followed by PROM)  Tissue Mobilized: Scar/adhesion  Finger Mobilized: 5th digit  RUE Soft Tissue Mobilization: Yes   Therapeutic Exercise:                                                                               : The patient's home exercise program was reviewed.                                                 Date:  10/5/17 Date: Date: Date: Date:   Activity/Exercise Parameters Parameters Parameters Parameters Parameters   AROM during Fluidotherapy 20 min       Paraffin with Stretch          Retrograde massage, Friction Scar massage, Joint Mobilization   15 min       Scarf Curl   3 min       Washer Game        Individual Gripper          Hand [place]          Cones          Pegs          Clothes Pins          A-R Bar          Exerstick          Velcro-Roll          A&PROM EX. 5 min       RESISTIVE EXERCISES Weight/ Sets/Reps   Weight/ Sets/Reps Weight/ Sets/Reps Weight/ Sets/Reps Weight/ Sets/Reps   WEIGHT WELL        Sup/Pro        UD/RD        Wrist Flex/Ext        Free Weights          UBE(Minutes)          Nautilus        Compound Row Vertical Chest        Overhead Press                    HEP: As above; handouts given to patient for all exercises. Therapeutic Modalities:      Right Wrist Heat  Type: Fluidotherapy (while perfoming AROM ex)  Duration : 20 minutes  Patient Position: Sitting                                        Joint Mobilization:        Treatment Times:  · Therapeutic Exercise: 15 minutes  · Manual Therapy: 15 minutes  · Parafin:  minutes  · Whirlpool: 15 minutes  · Other: minutes       Treatment/Session Assessment:    · Response to Treatment:  Patients tolerated treatment well with no complications. Upon completion of treatment, skin condition was normal..  · Compliance with Program/Exercises: Will assess as treatment progresses. · Recommendations/Intent for next treatment session: \"Next visit will focus on advancements to more challenging activities\". The patient's splint was adjusted to increase MP flexion.   Total Treatment Duration:  OT Patient Time In/Time Out  Time In: 0100  Time Out: Onesimo Bruno OT

## 2017-10-16 ENCOUNTER — HOSPITAL ENCOUNTER (OUTPATIENT)
Dept: PHYSICAL THERAPY | Age: 57
Discharge: HOME | End: 2017-10-16
Payer: COMMERCIAL

## 2017-10-16 PROCEDURE — 97018 PARAFFIN BATH THERAPY: CPT

## 2017-10-16 PROCEDURE — 97140 MANUAL THERAPY 1/> REGIONS: CPT

## 2017-10-16 PROCEDURE — 97110 THERAPEUTIC EXERCISES: CPT

## 2017-10-16 NOTE — PROGRESS NOTES
Migdalia Lambert  : 1960 [facility] [place] [address] [facility], [address], [place] [address].  [phone]   [phone]         OUTPATIENT OCCUPATIONAL THERAPY: Daily Note 10/16/2017     ICD-10: Treatment Diagnosis: Stiffness of right hand, not elsewhere classified (M25.641)Pain in right hand (M79.641)  Precautions/Allergies:   Penicillins   Fall Risk Score: 0 (? 5 = High Risk)  MD Orders: Evaluate and treat: ulnar gutter with MP's at least 70 degrees, hand based. 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 Neeta Canada presents with decreased functional use, strength and range of motion of her right Little finger MCP joint dorsal aspect and PIP joint dorsal aspect. and upper extremity that is affecting her independence with activities of daily living and ability to perform job tasks. I feel that Ms. Neeta Canada will benefit from skilled occupational therapy to maximize the functional use of her Little finger MCP joint dorsal aspect. and upper extremity in daily activities and work tasks. PLAN OF CARE:   PROBLEM LIST:  1. Pain in right little finger. 2. Decreased motion in right little finger. 3. Decreased strength in right hand. INTERVENTIONS PLANNED:  1. Modalities that may include fluidotherapy, paraffin, ultrasound, and light therapy. 2. Therapeutic exercise including a home exercise program.  3. Manual therapy. 4. Therapeutic activities. TREATMENT PLAN:  Effective Dates: 2017 TO 2017. Frequency/Duration: 1-2 times a week for 10 weeks  GOALS: (Goals have been discussed and agreed upon with patient.)  Short-Term Functional Goals: Time Frame: 4 weeks  1. Decrease pain to 5 to allow patient to perform self care tasks.   2. Increase motion in right little finger by 15 degrees to improve functional use of upper extremity in ADL activities. 3. Increase strength in right hand by 10 pounds to allow patient to  and lift objects during self care activities. Discharge Goals: Time Frame: 10 weeks  1. Decrease pain to 2 to allow patient to perform all household and work tasks. 2. Increase motion in right little finger by 30 degreees to allow patient to perform all ADL activities. 3. Increase strength in right hand by 15 pounds to allow patient to , lift, hold, and carry heavy objects. Rehabilitation Potential For Stated Goals: Good  Regarding Natalie MILLER Delano's therapy, I certify that the treatment plan above will be carried out by a therapist or under their direction. Thank you for this referral,  Say Smalls OT       Referring Physician Signature: Christian Muse MD _________________________  Date _________            The information in this section was collected on 9/26/2017 (except where otherwise noted). OCCUPATIONAL PROFILE & HISTORY:   History of Present Injury/Illness (Reason for Referral): The patient had developed a contracture in her right little finger  Past Medical History/Comorbidities:   Ms. Elen Nowak  has a past medical history of Anemia; GERD (gastroesophageal reflux disease); and TMJ (temporomandibular joint syndrome). She also has no past medical history of Adverse effect of anesthesia; Difficult intubation; Malignant hyperthermia due to anesthesia; Nausea & vomiting; or Pseudocholinesterase deficiency. Ms. Elen Nowak  has a past surgical history that includes fracture tx (Left, 2000); carpal tunnel release (Left, 2/2013); knee arthroscopy (Right); orthopaedic (age 45); orthopaedic (age 35); and orthopaedic (Right).   Social History/Living Environment:     private residence  Prior Level of Function/Work/Activity:  independent  Dominant Side:         RIGHT    Current Medications:    Current Outpatient Prescriptions:     omeprazole (PRILOSEC) 20 mg capsule, Take 20 mg by mouth nightly., Disp: , Rfl:     traMADol (ULTRAM) 50 mg tablet, Take 50 mg by mouth every six (6) hours as needed. , Disp: , Rfl:     busPIRone (BUSPAR) 15 mg tablet, Take 15 mg by mouth nightly. Helps with TMJ pain, Disp: , Rfl:    Date Last Reviewed: 10/16/2017    Number of medical conditions (excluding presenting problem) that affect the Plan of Care: Brief history (0):  LOW COMPLEXITY   ASSESSMENT OF OCCUPATIONAL PERFORMANCE:   RANGE OF MOTION:     · AROM: Right little finger motion is as follows: MP HE/10, PIP -35/75, DIP -15/45 degrees. STRENGTH:  Not tested yet            SENSATION:  intact           Physical Skills Involved:  1. Range of Motion  2. Strength  3. Pain (acute) Cognitive Skills Affected (resulting in the inability to perform in a timely and safe manner): 1. none Psychosocial Skills Affected:  1. none   Number of elements that affect the Plan of Care: 1-3:  LOW COMPLEXITY   CLINICAL DECISION MAKING:   Outcome Measure: Tool Used: Disabilities of the Arm, Shoulder and Hand (DASH) Questionnaire - Quick Version  Score:  Initial: 33/55  Most Recent: X/55 (Date: -- )   Interpretation of Score: The DASH is designed to measure the activities of daily living in person's with upper extremity dysfunction or pain. Each section is scored on a 1-5 scale, 5 representing the greatest disability. The scores of each section are added together for a total score of 55. Score 11 12-19 20-28 29-37 38-45 46-54 55   Modifier CH CI CJ CK CL CM CN     ?  Carrying, Moving, and Handling Objects:     - CURRENT STATUS: CK - 40%-59% impaired, limited or restricted    - GOAL STATUS: CJ - 20%-39% impaired, limited or restricted    - D/C STATUS:  ---------------To be determined---------------      Medical Necessity:   · Patient is expected to demonstrate progress in strength and range of motion to decrease assistance required with ADL,household and work activities. .  Reason for Services/Other Comments:  · Patient continues to require skilled intervention due to limited motion,strength and function in her right hand. .  Clinical Decision-Making Assessment:     Clinical Decision-Making: LOW COMPLEXITY   TREATMENT:   (In addition to Assessment/Re-Assessment sessions the following treatments were rendered)  Pre-treatment Symptoms/Complaints:  Pain and stiffness in right hand and stiffness in right little finger. Pain: Initial: Pain Intensity 1: 2  Pain Location 1: Hand (little finger)  Pain Orientation 1: Right  Post Session:  2     Patient was provided with a fabricated splint with her MP joints at maximun flexion. Patient stated \"I think my splint can be flexed down more. \"    Manual Therapy: (Soft Tissue Mobilization Duration  Duration: 15 Minutes  Duration: 15 Minutes): Technique: Retrograde massage (followed by PROM)  Tissue Mobilized: Scar/adhesion  Finger Mobilized: 5th digit  RUE Soft Tissue Mobilization: Yes   Therapeutic Exercise:                                                                               : The patient's home exercise program was reviewed.                                                 Date:  10/5/17 Date:  10/16/17 Date: Date: Date:   Activity/Exercise Parameters Parameters Parameters Parameters Parameters   AROM during Fluidotherapy 20 min 10 min  AROM ex      Paraffin with Stretch    15 min  flexion      Retrograde massage, Friction Scar massage, Joint Mobilization   15 min 15 min      Scarf Curl   3 min 3 min      Washer Game        Individual Gripper          Hand [place]          [place]es          Pegs          Clothes Pins          A-R Bar          Exerstick          Velcro-Roll          A&PROM EX. 5 min 2 min      RESISTIVE EXERCISES Weight/ Sets/Reps   Weight/ Sets/Reps Weight/ Sets/Reps Weight/ Sets/Reps Weight/ Sets/Reps   WEIGHT WELL        Sup/Pro        UD/RD        Wrist Flex/Ext        Free Weights          UBE(Minutes) Olga Lidia        Compound Row        Vertical Chest        [employer]                    HEP: As above; handouts given to patient for all exercises. Therapeutic Modalities:      Right Wrist Heat  Type: Paraffin bath (with a finger flexion stretch using coban)  Duration : 15 minutes  Patient Position: Sitting                                        Joint Mobilization:        Treatment Times:  · Therapeutic Exercise: 15 minutes  · Manual Therapy: 15 minutes  · Parafin:15  minutes  · Whirlpool:  minutes  · Other: minutes       Treatment/Session Assessment:    · Response to Treatment:  Patients tolerated treatment well with no complications. Upon completion of treatment, skin condition was normal..  · Compliance with Program/Exercises: Will assess as treatment progresses. · Recommendations/Intent for next treatment session: \"Next visit will focus on advancements to more challenging activities\". The patient's splint was adjusted to increase MP flexion.   Total Treatment Duration:  OT Patient Time In/Time Out  Time In: 0730  Time Out: [facility],

## 2017-10-24 ENCOUNTER — HOSPITAL ENCOUNTER (OUTPATIENT)
Dept: PHYSICAL THERAPY | Age: 57
Discharge: HOME | End: 2017-10-24
Payer: COMMERCIAL

## 2017-10-24 PROCEDURE — 97110 THERAPEUTIC EXERCISES: CPT

## 2017-10-24 PROCEDURE — 97140 MANUAL THERAPY 1/> REGIONS: CPT

## 2017-10-24 PROCEDURE — 97018 PARAFFIN BATH THERAPY: CPT

## 2017-10-24 NOTE — PROGRESS NOTES
Suhail Mi  : 1960 [facility] [place] [id number] [facility], [address], [facility] [address].  [phone]   [phone]         OUTPATIENT OCCUPATIONAL THERAPY: Daily Note 10/24/2017     ICD-10: Treatment Diagnosis: Stiffness of right hand, not elsewhere classified (M25.641)Pain in right hand (M79.641)  Precautions/Allergies:   Penicillins   Fall Risk Score: 0 (? 5 = High Risk)  MD Orders: Evaluate and treat: ulnar gutter with MP's at least 70 degrees, hand based. 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 Shivani Mccarthy presents with decreased functional use, strength and range of motion of her right Little finger MCP joint dorsal aspect and PIP joint dorsal aspect. and upper extremity that is affecting her independence with activities of daily living and ability to perform job tasks. I feel that Ms. Shivani Mccarthy will benefit from skilled occupational therapy to maximize the functional use of her Little finger MCP joint dorsal aspect. and upper extremity in daily activities and work tasks. PLAN OF CARE:   PROBLEM LIST:  1. Pain in right little finger. 2. Decreased motion in right little finger. 3. Decreased strength in right hand. INTERVENTIONS PLANNED:  1. Modalities that may include fluidotherapy, paraffin, ultrasound, and light therapy. 2. Therapeutic exercise including a home exercise program.  3. Manual therapy. 4. Therapeutic activities. TREATMENT PLAN:  Effective Dates: 2017 TO 2017. Frequency/Duration: 1-2 times a week for 10 weeks  GOALS: (Goals have been discussed and agreed upon with patient.)  Short-Term Functional Goals: Time Frame: 4 weeks  1. Decrease pain to 5 to allow patient to perform self care tasks.   2. Increase motion in right little finger by 15 degrees to improve functional use of upper extremity in ADL activities. 3. Increase strength in right hand by 10 pounds to allow patient to  and lift objects during self care activities. Discharge Goals: Time Frame: 10 weeks  1. Decrease pain to 2 to allow patient to perform all household and work tasks. 2. Increase motion in right little finger by 30 degreees to allow patient to perform all ADL activities. 3. Increase strength in right hand by 15 pounds to allow patient to , lift, hold, and carry heavy objects. Rehabilitation Potential For Stated Goals: Good  Regarding Natalie MILLER Delano's therapy, I certify that the treatment plan above will be carried out by a therapist or under their direction. Thank you for this referral,  Giovanni Branch, OT       Referring Physician Signature: Oralia Mann MD _________________________  Date _________            The information in this section was collected on 9/26/2017 (except where otherwise noted). OCCUPATIONAL PROFILE & HISTORY:   History of Present Injury/Illness (Reason for Referral): The patient had developed a contracture in her right little finger  Past Medical History/Comorbidities:   Ms. Ferny Ferro  has a past medical history of Anemia; GERD (gastroesophageal reflux disease); and TMJ (temporomandibular joint syndrome). She also has no past medical history of Adverse effect of anesthesia; Difficult intubation; Malignant hyperthermia due to anesthesia; Nausea & vomiting; or Pseudocholinesterase deficiency. Ms. Ferny Ferro  has a past surgical history that includes fracture tx (Left, 2000); carpal tunnel release (Left, 2/2013); knee arthroscopy (Right); orthopaedic (age 45); orthopaedic (age 35); and orthopaedic (Right).   Social History/Living Environment:     private residence  Prior Level of Function/Work/Activity:  independent  Dominant Side:         RIGHT    Current Medications:    Current Outpatient Prescriptions:     omeprazole (PRILOSEC) 20 mg capsule, Take 20 mg by mouth nightly., Disp: , Rfl:     traMADol (ULTRAM) 50 mg tablet, Take 50 mg by mouth every six (6) hours as needed. , Disp: , Rfl:     busPIRone (BUSPAR) 15 mg tablet, Take 15 mg by mouth nightly. Helps with TMJ pain, Disp: , Rfl:    Date Last Reviewed: 10/24/2017    Number of medical conditions (excluding presenting problem) that affect the Plan of Care: Brief history (0):  LOW COMPLEXITY   ASSESSMENT OF OCCUPATIONAL PERFORMANCE:   RANGE OF MOTION:     · AROM: Right little finger motion is as follows: MP HE/10, PIP -35/75, DIP -15/45 degrees. STRENGTH:  Not tested yet            SENSATION:  intact           Physical Skills Involved:  1. Range of Motion  2. Strength  3. Pain (acute) Cognitive Skills Affected (resulting in the inability to perform in a timely and safe manner): 1. none Psychosocial Skills Affected:  1. none   Number of elements that affect the Plan of Care: 1-3:  LOW COMPLEXITY   CLINICAL DECISION MAKING:   Outcome Measure: Tool Used: Disabilities of the Arm, Shoulder and Hand (DASH) Questionnaire - Quick Version  Score:  Initial: 33/55  Most Recent: X/55 (Date: -- )   Interpretation of Score: The DASH is designed to measure the activities of daily living in person's with upper extremity dysfunction or pain. Each section is scored on a 1-5 scale, 5 representing the greatest disability. The scores of each section are added together for a total score of 55. Score 11 12-19 20-28 29-37 38-45 46-54 55   Modifier CH CI CJ CK CL CM CN     ?  Carrying, Moving, and Handling Objects:     - CURRENT STATUS: CK - 40%-59% impaired, limited or restricted    - GOAL STATUS: CJ - 20%-39% impaired, limited or restricted    - D/C STATUS:  ---------------To be determined---------------      Medical Necessity:   · Patient is expected to demonstrate progress in strength and range of motion to decrease assistance required with ADL,household and work activities. .  Reason for Services/Other Comments:  · Patient continues to require skilled intervention due to limited motion,strength and function in her right hand. .  Clinical Decision-Making Assessment:     Clinical Decision-Making: LOW COMPLEXITY   TREATMENT:   (In addition to Assessment/Re-Assessment sessions the following treatments were rendered)  Pre-treatment Symptoms/Complaints:  Pain and stiffness in right hand and stiffness in right little finger. Pain: Initial: Pain Intensity 1: 0  Pain Location 1: Hand (little finger)  Pain Orientation 1: Right  Post Session:  0     Patient was provided with a fabricated splint with her MP joints at maximun flexion. Patient stated \"My splint seems to slide up. \"    Manual Therapy: (Soft Tissue Mobilization Duration  Duration: 15 Minutes  Duration: 15 Minutes): Technique: Retrograde massage (followed by PROM)  Tissue Mobilized: Scar/adhesion  Finger Mobilized: 5th digit  RUE Soft Tissue Mobilization: Yes   Therapeutic Exercise:                                                                               : The patient's home exercise program was reviewed.                                                 Date:  10/5/17 Date:  10/16/17 Date:  10/24/17 Date: Date:   Activity/Exercise Parameters Parameters Parameters Parameters Parameters   AROM during Fluidotherapy 20 min 10 min  AROM ex 15 min  AROM     Paraffin with Stretch    15 min  flexion 15 min  flexion     Retrograde massage, Friction Scar massage, Joint Mobilization   15 min 15 min 15 min     Scarf Curl   3 min 3 min 2 min     Washer Game        Individual Gripper          Hand [place]          Cones          Pegs          Clothes Pins          A-R Bar          Exerstick          Velcro-Roll          A&PROM EX. 5 min 2 min 3 min     RESISTIVE EXERCISES Weight/ Sets/Reps   Weight/ Sets/Reps Weight/ Sets/Reps Weight/ Sets/Reps Weight/ Sets/Reps   WEIGHT WELL        Sup/Pro        UD/RD        Wrist Flex/Ext        Free Weights          UBE(Minutes)          Nautilus        Compound Row        Vertical [facility]        [employer]                    HEP: As above; handouts given to patient for all exercises. Therapeutic Modalities:      Right Wrist Heat  Type: Paraffin bath (with a finger flexion stretch)  Duration : 15 minutes  Patient Position: Sitting                                        Joint Mobilization:        Treatment Times:  · Therapeutic Exercise: 15 minutes  · Manual Therapy: 15 minutes  · Parafin:15  minutes  · Whirlpool:  minutes  · Other: minutes       Treatment/Session Assessment:    · Response to Treatment:  Patients tolerated treatment well with no complications. Upon completion of treatment, skin condition was normal..  · Compliance with Program/Exercises: Will assess as treatment progresses. · Recommendations/Intent for next treatment session: \"Next visit will focus on advancements to more challenging activities\". MD progress note completed.  Will continue per MD.  Total Treatment Duration:  OT Patient Time In/Time Out  Time In: 2680  Time Out: 225 [facility],

## 2017-11-06 ENCOUNTER — HOSPITAL ENCOUNTER (OUTPATIENT)
Dept: PHYSICAL THERAPY | Age: 57
Discharge: HOME | End: 2017-11-06
Payer: COMMERCIAL

## 2017-11-06 PROCEDURE — 97110 THERAPEUTIC EXERCISES: CPT

## 2017-11-06 PROCEDURE — 97140 MANUAL THERAPY 1/> REGIONS: CPT

## 2017-11-06 PROCEDURE — 97018 PARAFFIN BATH THERAPY: CPT

## 2017-11-06 NOTE — PROGRESS NOTES
Osmany Josejose  : 1960 [facility] [place] [address] [facility], [address], [address] [facility]  [phone]   [phone]         OUTPATIENT OCCUPATIONAL THERAPY: Daily Note 2017     ICD-10: Treatment Diagnosis: Stiffness of right hand, not elsewhere classified (M25.641)Pain in right hand (M79.641)  Precautions/Allergies:   Penicillins   Fall Risk Score: 0 (? 5 = High Risk)  MD Orders: Evaluate and treat: ulnar gutter with MP's at least 70 degrees, hand based. 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 Sandra Dietrich presents with decreased functional use, strength and range of motion of her right Little finger MCP joint dorsal aspect and PIP joint dorsal aspect. and upper extremity that is affecting her independence with activities of daily living and ability to perform job tasks. I feel that Ms. Sandra Dietrich will benefit from skilled occupational therapy to maximize the functional use of her Little finger MCP joint dorsal aspect. and upper extremity in daily activities and work tasks. PLAN OF CARE:   PROBLEM LIST:  1. Pain in right little finger. 2. Decreased motion in right little finger. 3. Decreased strength in right hand. INTERVENTIONS PLANNED:  1. Modalities that may include fluidotherapy, paraffin, ultrasound, and light therapy. 2. Therapeutic exercise including a home exercise program.  3. Manual therapy. 4. Therapeutic activities. TREATMENT PLAN:  Effective Dates: 2017 TO 2017. Frequency/Duration: 1-2 times a week for 10 weeks  GOALS: (Goals have been discussed and agreed upon with patient.)  Short-Term Functional Goals: Time Frame: 4 weeks  1. Decrease pain to 5 to allow patient to perform self care tasks.   2. Increase motion in right little finger by 15 degrees to improve functional use of upper extremity in ADL activities. 3. Increase strength in right hand by 10 pounds to allow patient to  and lift objects during self care activities. Discharge Goals: Time Frame: 10 weeks  1. Decrease pain to 2 to allow patient to perform all household and work tasks. 2. Increase motion in right little finger by 30 degreees to allow patient to perform all ADL activities. 3. Increase strength in right hand by 15 pounds to allow patient to , lift, hold, and carry heavy objects. Rehabilitation Potential For Stated Goals: Good  Regarding Natalie Wick's therapy, I certify that the treatment plan above will be carried out by a therapist or under their direction. Thank you for this referral,  Treva Houser OT       Referring Physician Signature: Alcon Tipton MD _________________________  Date _________            The information in this section was collected on 9/26/2017 (except where otherwise noted). OCCUPATIONAL PROFILE & HISTORY:   History of Present Injury/Illness (Reason for Referral): The patient had developed a contracture in her right little finger  Past Medical History/Comorbidities:   Ms. Alejandro Ramos  has a past medical history of Anemia; GERD (gastroesophageal reflux disease); and TMJ (temporomandibular joint syndrome). She also has no past medical history of Adverse effect of anesthesia; Difficult intubation; Malignant hyperthermia due to anesthesia; Nausea & vomiting; or Pseudocholinesterase deficiency. Ms. Alejandro Ramos  has a past surgical history that includes fracture tx (Left, 2000); carpal tunnel release (Left, 2/2013); knee arthroscopy (Right); orthopaedic (age 45); orthopaedic (age 35); and orthopaedic (Right).   Social History/Living Environment:     private residence  Prior Level of Function/Work/Activity:  independent  Dominant Side:         RIGHT    Current Medications:    Current Outpatient Prescriptions:     omeprazole (PRILOSEC) 20 mg capsule, Take 20 mg by mouth nightly., Disp: , Rfl:     traMADol (ULTRAM) 50 mg tablet, Take 50 mg by mouth every six (6) hours as needed. , Disp: , Rfl:     busPIRone (BUSPAR) 15 mg tablet, Take 15 mg by mouth nightly. Helps with TMJ pain, Disp: , Rfl:    Date Last Reviewed: 11/6/2017    Number of medical conditions (excluding presenting problem) that affect the Plan of Care: Brief history (0):  LOW COMPLEXITY   ASSESSMENT OF OCCUPATIONAL PERFORMANCE:   RANGE OF MOTION:     · AROM: Right little finger motion is as follows: MP HE/10, PIP -35/75, DIP -15/45 degrees. STRENGTH:  Not tested yet            SENSATION:  intact           Physical Skills Involved:  1. Range of Motion  2. Strength  3. Pain (acute) Cognitive Skills Affected (resulting in the inability to perform in a timely and safe manner): 1. none Psychosocial Skills Affected:  1. none   Number of elements that affect the Plan of Care: 1-3:  LOW COMPLEXITY   CLINICAL DECISION MAKING:   Outcome Measure: Tool Used: Disabilities of the Arm, Shoulder and Hand (DASH) Questionnaire - Quick Version  Score:  Initial: 33/55  Most Recent: X/55 (Date: -- )   Interpretation of Score: The DASH is designed to measure the activities of daily living in person's with upper extremity dysfunction or pain. Each section is scored on a 1-5 scale, 5 representing the greatest disability. The scores of each section are added together for a total score of 55. Score 11 12-19 20-28 29-37 38-45 46-54 55   Modifier CH CI CJ CK CL CM CN     ?  Carrying, Moving, and Handling Objects:     - CURRENT STATUS: CK - 40%-59% impaired, limited or restricted    - GOAL STATUS: CJ - 20%-39% impaired, limited or restricted    - D/C STATUS:  ---------------To be determined---------------      Medical Necessity:   · Patient is expected to demonstrate progress in strength and range of motion to decrease assistance required with ADL,household and work activities. .  Reason for Services/Other Comments:  · Patient continues to require skilled intervention due to limited motion,strength and function in her right hand. .  Clinical Decision-Making Assessment:     Clinical Decision-Making: LOW COMPLEXITY   TREATMENT:   (In addition to Assessment/Re-Assessment sessions the following treatments were rendered)  Pre-treatment Symptoms/Complaints:  Pain and stiffness in right hand and stiffness in right little finger. Pain: Initial: Pain Intensity 1: 2  Pain Location 1: Hand  Pain Orientation 1: Right  Post Session:  0     Patient was provided with a fabricated splint with her MP joints at maximun flexion. Patient stated Annette Daniel wants me to wear my splint for 6 more weeks. \"    Manual Therapy: (Soft Tissue Mobilization Duration  Duration: 15 Minutes  Duration: 15 Minutes): Technique: Retrograde massage (followed by PROM)  Tissue Mobilized: Scar/adhesion  RUE Soft Tissue Mobilization: Yes   Therapeutic Exercise:                                                                               : The patient's home exercise program was reviewed.                                                 Date:  10/5/17 Date:  10/16/17 Date:  10/24/17 Date:  11/6/17 Date:   Activity/Exercise Parameters Parameters Parameters Parameters Parameters   AROM during Fluidotherapy 20 min 10 min  AROM ex 15 min  AROM 15 min  AROM    Paraffin with Stretch    15 min  flexion 15 min  flexion 15 min  flexion    Retrograde massage, Friction Scar massage, Joint Mobilization   15 min 15 min 15 min 15 min    Scarf Curl   3 min 3 min 2 min 2 min    Washer Game        Individual Gripper          Hand [place]          Cones          Pegs          Clothes Pins          A-R Bar          Exerstick          Velcro-Roll          A&PROM EX. 5 min 2 min 3 min 2 min    RESISTIVE EXERCISES Weight/ Sets/Reps   Weight/ Sets/Reps Weight/ Sets/Reps Weight/ Sets/Reps Weight/ Sets/Reps   WEIGHT WELL        Sup/Pro        UD/RD Wrist Flex/Ext        Free Weights          UBE(Minutes)          Nautilus        Compound Row        Vertical Chest        Overhead Press                    HEP: As above; handouts given to patient for all exercises. Therapeutic Modalities:      Right Wrist Heat  Type: Paraffin bath (with a finger flexion stretch)  Duration : 15 minutes  Patient Position: Sitting                                        Joint Mobilization:        Treatment Times:  · Therapeutic Exercise: 15 minutes  · Manual Therapy: 15 minutes  · Parafin:15  minutes  · Whirlpool:  minutes  · Other: minutes       Treatment/Session Assessment:    · Response to Treatment:  Patients tolerated treatment well with no complications. Upon completion of treatment, skin condition was normal..  · Compliance with Program/Exercises: Will assess as treatment progresses. · Recommendations/Intent for next treatment session: \"Next visit will focus on advancements to more challenging activities\". . Will continue per MD.  Total Treatment Duration:  OT Patient Time In/Time Out  Time In: 0730  Time Out: 2[address] [facility],

## 2018-02-28 NOTE — THERAPY DISCHARGE
Rivas Bautista  : 1960 [facility] [place] [address], [address], [address] [facility]  [phone]   [phone]         OUTPATIENT OCCUPATIONAL THERAPY: Discharge      ICD-10: Treatment Diagnosis: Stiffness of right hand, not elsewhere classified (M25.641)Pain in right hand (M79.641)  Precautions/Allergies:   Penicillins   Fall Risk Score: 0 (? 5 = High Risk)  MD Orders: Evaluate and treat: ulnar gutter with MP's at least 70 degrees, hand based. 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 Adonis Khan presents with decreased functional use, strength and range of motion of her right Little finger MCP joint dorsal aspect and PIP joint dorsal aspect. and upper extremity that is affecting her independence with activities of daily living and ability to perform job tasks. I feel that Ms. Adonis Khan will benefit from skilled occupational therapy to maximize the functional use of her Little finger MCP joint dorsal aspect. and upper extremity in daily activities and work tasks. PLAN OF CARE:   PROBLEM LIST:  1. Pain in right little finger. 2. Decreased motion in right little finger. 3. Decreased strength in right hand. INTERVENTIONS PLANNED:  1. Modalities that may include fluidotherapy, paraffin, ultrasound, and light therapy. 2. Therapeutic exercise including a home exercise program.  3. Manual therapy. 4. Therapeutic activities. TREATMENT PLAN:  Effective Dates: 2017 TO 2017. Frequency/Duration: 1-2 times a week for 10 weeks  GOALS: (Goals have been discussed and agreed upon with patient.)  Short-Term Functional Goals: Time Frame: 4 weeks  1. Decrease pain to 5 to allow patient to perform self care tasks. ( GOAL MET )  2.  Increase motion in right little finger by 15 degrees to improve functional use of upper extremity in ADL activities. ( GOAL MET )  3. Increase strength in right hand by 10 pounds to allow patient to  and lift objects during self care activities. ( NOT TESTED )  Discharge Goals: Time Frame: 10 weeks  1. Decrease pain to 2 to allow patient to perform all household and work tasks. ( GOAL MET )  2. Increase motion in right little finger by 30 degreees to allow patient to perform all ADL activities. ( GOAL MET )  3. Increase strength in right hand by 15 pounds to allow patient to , lift, hold, and carry heavy objects. ( NOT TESTED )  Rehabilitation Potential For Stated Goals: Good  Regarding Natalie Wick's therapy, I certify that the treatment plan above will be carried out by a therapist or under their direction. Thank you for this referral,  Dalia Dior OT       Referring Physician Signature: Soco Myers MD _________________________  Date _________            The information in this section was collected on 9/26/2017 (except where otherwise noted). OCCUPATIONAL PROFILE & HISTORY:   History of Present Injury/Illness (Reason for Referral): The patient had developed a contracture in her right little finger  Past Medical History/Comorbidities:   Ms. Layne Elliott  has a past medical history of Anemia; GERD (gastroesophageal reflux disease); and TMJ (temporomandibular joint syndrome). She also has no past medical history of Adverse effect of anesthesia; Difficult intubation; Malignant hyperthermia due to anesthesia; Nausea & vomiting; or Pseudocholinesterase deficiency. Ms. Layne Elliott  has a past surgical history that includes hx fracture tx (Left, 2000); hx carpal tunnel release (Left, 2/2013); hx knee arthroscopy (Right); hx orthopaedic (age 45); hx orthopaedic (age 35); and hx orthopaedic (Right).   Social History/Living Environment:     private residence  Prior Level of Function/Work/Activity:  independent  Dominant Side:         RIGHT    Current Medications:    Current Outpatient Prescriptions:     omeprazole (PRILOSEC) 20 mg capsule, Take 20 mg by mouth nightly., Disp: , Rfl:     traMADol (ULTRAM) 50 mg tablet, Take 50 mg by mouth every six (6) hours as needed. , Disp: , Rfl:     busPIRone (BUSPAR) 15 mg tablet, Take 15 mg by mouth nightly. Helps with TMJ pain, Disp: , Rfl:    Date Last Reviewed: 11/6/2017    Number of medical conditions (excluding presenting problem) that affect the Plan of Care: Brief history (0):  LOW COMPLEXITY   ASSESSMENT OF OCCUPATIONAL PERFORMANCE:   RANGE OF MOTION:     · AROM: Right little finger motion is as follows: MP HE/52, PIP -20/90, DIP 0/62 degrees. STRENGTH:  Not tested yet            SENSATION:  intact           Physical Skills Involved:  1. Range of Motion  2. Strength  3. Pain (acute) Cognitive Skills Affected (resulting in the inability to perform in a timely and safe manner): 1. none Psychosocial Skills Affected:  1. none   Number of elements that affect the Plan of Care: 1-3:  LOW COMPLEXITY   CLINICAL DECISION MAKING:   Outcome Measure: Tool Used: Disabilities of the Arm, Shoulder and Hand (DASH) Questionnaire - Quick Version  Score:  Initial: 33/55  Most Recent: X/55 (Date: -- )   Interpretation of Score: The DASH is designed to measure the activities of daily living in person's with upper extremity dysfunction or pain. Each section is scored on a 1-5 scale, 5 representing the greatest disability. The scores of each section are added together for a total score of 55. Score 11 12-19 20-28 29-37 38-45 46-54 55   Modifier CH CI CJ CK CL CM CN     ?  Carrying, Moving, and Handling Objects:     - CURRENT STATUS: CK - 40%-59% impaired, limited or restricted    - GOAL STATUS: CJ - 20%-39% impaired, limited or restricted    - D/C STATUS:  ---------------To be determined---------------      Medical Necessity:   · Patient is expected to demonstrate progress in strength and range of motion to decrease assistance required with ADL,household and work activities. .  Reason for Services/Other Comments:  · Patient continues to require skilled intervention due to limited motion,strength and function in her right hand. .  Clinical Decision-Making Assessment:     Clinical Decision-Making: LOW COMPLEXITY   TREATMENT:       Treatment/Session Assessment:    · Response to Treatment:  Patients tolerated treatment well with no complications. Upon completion of treatment, skin condition was normal..  · Compliance with Program/Exercises: Will assess as treatment progresses. · Recommendations/Intent for next treatment session: \"To discharge. \"        Alanna Pena, OT

## 2024-07-22 ENCOUNTER — HOSPITAL ENCOUNTER (OUTPATIENT)
Dept: MAMMOGRAPHY | Age: 64
Discharge: HOME | End: 2024-07-25
Payer: COMMERCIAL

## 2024-07-22 DIAGNOSIS — Z13.820 ENCOUNTER FOR SCREENING FOR OSTEOPOROSIS: ICD-10-CM

## 2024-07-22 DIAGNOSIS — Z12.39 ENCOUNTER FOR BREAST CANCER SCREENING OTHER THAN MAMMOGRAM: ICD-10-CM

## 2024-07-22 DIAGNOSIS — Z12.31 ENCOUNTER FOR SCREENING MAMMOGRAM FOR MALIGNANT NEOPLASM OF BREAST: ICD-10-CM

## 2024-07-22 PROCEDURE — 77080 DXA BONE DENSITY AXIAL: CPT

## 2024-07-22 PROCEDURE — 77063 BREAST TOMOSYNTHESIS BI: CPT

## 2024-10-10 ENCOUNTER — HOSPITAL ENCOUNTER (EMERGENCY)
Age: 64
Discharge: HOME | End: 2024-10-10
Attending: EMERGENCY MEDICINE
Payer: COMMERCIAL

## 2024-10-10 VITALS
HEIGHT: 68 IN | WEIGHT: 175 LBS | DIASTOLIC BLOOD PRESSURE: 89 MMHG | SYSTOLIC BLOOD PRESSURE: 139 MMHG | RESPIRATION RATE: 13 BRPM | OXYGEN SATURATION: 94 % | HEART RATE: 76 BPM | BODY MASS INDEX: 26.52 KG/M2 | TEMPERATURE: 99 F

## 2024-10-10 DIAGNOSIS — R07.9 CHEST PAIN, UNSPECIFIED TYPE: Primary | ICD-10-CM

## 2024-10-10 LAB
ALBUMIN SERPL-MCNC: 4.3 G/DL (ref 3.2–4.6)
ALBUMIN/GLOB SERPL: 1.6 (ref 1–1.9)
ALP SERPL-CCNC: 57 U/L (ref 35–104)
ALT SERPL-CCNC: 30 U/L (ref 8–45)
ANION GAP SERPL CALC-SCNC: 13 MMOL/L (ref 9–18)
AST SERPL-CCNC: 38 U/L (ref 15–37)
BASOPHILS # BLD: 0 K/UL (ref 0–0.2)
BASOPHILS NFR BLD: 0 % (ref 0–2)
BILIRUB SERPL-MCNC: 0.4 MG/DL (ref 0–1.2)
BUN SERPL-MCNC: 16 MG/DL (ref 8–23)
CALCIUM SERPL-MCNC: 9.9 MG/DL (ref 8.8–10.2)
CHLORIDE SERPL-SCNC: 99 MMOL/L (ref 98–107)
CO2 SERPL-SCNC: 26 MMOL/L (ref 20–28)
CREAT SERPL-MCNC: 1.05 MG/DL (ref 0.6–1.1)
DIFFERENTIAL METHOD BLD: ABNORMAL
EOSINOPHIL # BLD: 0 K/UL (ref 0–0.8)
EOSINOPHIL NFR BLD: 0 % (ref 0.5–7.8)
ERYTHROCYTE [DISTWIDTH] IN BLOOD BY AUTOMATED COUNT: 13.1 % (ref 11.9–14.6)
GLOBULIN SER CALC-MCNC: 2.7 G/DL (ref 2.3–3.5)
GLUCOSE SERPL-MCNC: 146 MG/DL (ref 70–99)
HCT VFR BLD AUTO: 37 % (ref 35.8–46.3)
HGB BLD-MCNC: 12.3 G/DL (ref 11.7–15.4)
IMM GRANULOCYTES # BLD AUTO: 0 K/UL (ref 0–0.5)
IMM GRANULOCYTES NFR BLD AUTO: 0 % (ref 0–5)
LIPASE SERPL-CCNC: 24 U/L (ref 13–60)
LYMPHOCYTES # BLD: 1.2 K/UL (ref 0.5–4.6)
LYMPHOCYTES NFR BLD: 20 % (ref 13–44)
MAGNESIUM SERPL-MCNC: 2 MG/DL (ref 1.8–2.4)
MCH RBC QN AUTO: 30.4 PG (ref 26.1–32.9)
MCHC RBC AUTO-ENTMCNC: 33.2 G/DL (ref 31.4–35)
MCV RBC AUTO: 91.6 FL (ref 82–102)
MONOCYTES # BLD: 0.4 K/UL (ref 0.1–1.3)
MONOCYTES NFR BLD: 6 % (ref 4–12)
NEUTS SEG # BLD: 4.3 K/UL (ref 1.7–8.2)
NEUTS SEG NFR BLD: 74 % (ref 43–78)
NRBC # BLD: 0 K/UL (ref 0–0.2)
PLATELET # BLD AUTO: 216 K/UL (ref 150–450)
PMV BLD AUTO: 10.1 FL (ref 9.4–12.3)
POTASSIUM SERPL-SCNC: 3.6 MMOL/L (ref 3.5–5.1)
PROT SERPL-MCNC: 7 G/DL (ref 6.3–8.2)
RBC # BLD AUTO: 4.04 M/UL (ref 4.05–5.2)
SODIUM SERPL-SCNC: 138 MMOL/L (ref 136–145)
TROPONIN T SERPL HS-MCNC: 12 NG/L (ref 0–14)
TROPONIN T SERPL HS-MCNC: 15 NG/L (ref 0–14)
WBC # BLD AUTO: 5.9 K/UL (ref 4.3–11.1)

## 2024-10-10 PROCEDURE — 83690 ASSAY OF LIPASE: CPT

## 2024-10-10 PROCEDURE — 96374 THER/PROPH/DIAG INJ IV PUSH: CPT

## 2024-10-10 PROCEDURE — 85025 COMPLETE CBC W/AUTO DIFF WBC: CPT

## 2024-10-10 PROCEDURE — 84484 ASSAY OF TROPONIN QUANT: CPT

## 2024-10-10 PROCEDURE — 80053 COMPREHEN METABOLIC PANEL: CPT

## 2024-10-10 PROCEDURE — 6360000002 HC RX W HCPCS: Performed by: EMERGENCY MEDICINE

## 2024-10-10 PROCEDURE — 93005 ELECTROCARDIOGRAM TRACING: CPT | Performed by: EMERGENCY MEDICINE

## 2024-10-10 PROCEDURE — 99284 EMERGENCY DEPT VISIT MOD MDM: CPT

## 2024-10-10 PROCEDURE — 6370000000 HC RX 637 (ALT 250 FOR IP): Performed by: EMERGENCY MEDICINE

## 2024-10-10 PROCEDURE — 83735 ASSAY OF MAGNESIUM: CPT

## 2024-10-10 RX ORDER — ONDANSETRON 4 MG/1
4 TABLET, ORALLY DISINTEGRATING ORAL EVERY 8 HOURS PRN
Qty: 12 TABLET | Refills: 0 | Status: SHIPPED | OUTPATIENT
Start: 2024-10-10

## 2024-10-10 RX ORDER — DICYCLOMINE HYDROCHLORIDE 10 MG/1
10 CAPSULE ORAL
Status: COMPLETED | OUTPATIENT
Start: 2024-10-10 | End: 2024-10-10

## 2024-10-10 RX ORDER — MAGNESIUM HYDROXIDE/ALUMINUM HYDROXICE/SIMETHICONE 120; 1200; 1200 MG/30ML; MG/30ML; MG/30ML
30 SUSPENSION ORAL
Status: COMPLETED | OUTPATIENT
Start: 2024-10-10 | End: 2024-10-10

## 2024-10-10 RX ORDER — LIDOCAINE HYDROCHLORIDE 20 MG/ML
15 SOLUTION OROPHARYNGEAL
Status: COMPLETED | OUTPATIENT
Start: 2024-10-10 | End: 2024-10-10

## 2024-10-10 RX ORDER — ONDANSETRON 2 MG/ML
4 INJECTION INTRAMUSCULAR; INTRAVENOUS ONCE
Status: COMPLETED | OUTPATIENT
Start: 2024-10-10 | End: 2024-10-10

## 2024-10-10 RX ORDER — SUCRALFATE 1 G/1
1 TABLET ORAL 4 TIMES DAILY
Qty: 56 TABLET | Refills: 0 | Status: SHIPPED | OUTPATIENT
Start: 2024-10-10 | End: 2024-10-24

## 2024-10-10 RX ADMIN — LIDOCAINE HYDROCHLORIDE 15 ML: 20 SOLUTION ORAL at 19:49

## 2024-10-10 RX ADMIN — ALUMINUM HYDROXIDE, MAGNESIUM HYDROXIDE, DIMETHICONE 30 ML: 400; 400; 40 SUSPENSION ORAL at 19:49

## 2024-10-10 RX ADMIN — DICYCLOMINE HYDROCHLORIDE 10 MG: 10 CAPSULE ORAL at 19:50

## 2024-10-10 RX ADMIN — ONDANSETRON 4 MG: 2 INJECTION, SOLUTION INTRAMUSCULAR; INTRAVENOUS at 19:51

## 2024-10-10 ASSESSMENT — ENCOUNTER SYMPTOMS
SHORTNESS OF BREATH: 0
VOMITING: 1
ABDOMINAL PAIN: 1
COUGH: 1
DIARRHEA: 0
NAUSEA: 1
CONSTIPATION: 0
SORE THROAT: 0
BACK PAIN: 0

## 2024-10-10 ASSESSMENT — PAIN SCALES - GENERAL: PAINLEVEL_OUTOF10: 10

## 2024-10-10 ASSESSMENT — PAIN - FUNCTIONAL ASSESSMENT: PAIN_FUNCTIONAL_ASSESSMENT: 0-10

## 2024-10-10 ASSESSMENT — PAIN DESCRIPTION - LOCATION: LOCATION: CHEST

## 2024-10-10 NOTE — ED TRIAGE NOTES
Pt presents complaint of Chest Pain.  Pt was seen at the doctor earlier today and sent for Xray and Xray showed interstitial lung disease.  Pt is SOB in triage.  Pt very anxious in triage.

## 2024-10-10 NOTE — ED PROVIDER NOTES
[facility] Emergency Department Provider Note                   PCP:                Nora Bravo APRN - NP               Age: 64 y.o.      Sex: female     MEDICAL DECISION MAKING  Complexity of Problems Addressed:   1 or more acute illness/injury that poses a threat to life or bodily function    Data Reviewed and Analyzed:  Category 1:    I have reviewed outside records from an external source for any pertinent PMH, ED visits, primary care visits, specialist visits, labs, EKG, and/or radiologic studies.    Category 2:     ED EKG Interpretation  EKG was interpreted in the absence of a cardiologist.    Rate: Rate: Normal  EKG Interpretation: EKG Interpretation: sinus rhythm, no evidence of arrhythmia  ST Segments: Normal ST segments - NO STEMI      I independently ordered and reviewed the labs.    There was no radiologic studies ordered.                Category 3:     Discussion of management or test interpretation:    MDM  Number of Diagnoses or Management Options  Chest pain, unspecified type  Diagnosis management comments: Patient presented for several days of lower chest and upper abdominal pain which I believe is from GERD with GI spasm.  Patient does have a history of swallowing problems and her primary doctor is referring her to get an EGD.  She had an outpatient chest x-ray done which I reviewed the report and shows interstitial lung disease but no acute process.  Patient's EKG showed a normal sinus rhythm with no acute ischemic ST changes.  Patient's initial troponin was barely elevated at 15 but repeat came back normal at 12 and ACS was ruled out.  Patient has been running 2 to 3 miles multiple times a week without any chest pain or shortness of breath.  Her CBC, CMP, and lipase showed no significant abnormalities.  Patient was given a GI cocktail and her chest and upper abdomen pain almost resolved.  Patient already takes omeprazole.  I prescribed Carafate, Levsin, and Zofran.  Patient was discharged

## 2024-10-11 ENCOUNTER — TRANSCRIBE ORDERS (OUTPATIENT)
Dept: SCHEDULING | Age: 64
End: 2024-10-11

## 2024-10-11 DIAGNOSIS — R93.89 ABNORMAL FINDINGS ON DIAGNOSTIC IMAGING OF OTHER SPECIFIED BODY STRUCTURES: Primary | ICD-10-CM

## 2024-10-11 LAB
EKG ATRIAL RATE: 81 BPM
EKG DIAGNOSIS: NORMAL
EKG P AXIS: 46 DEGREES
EKG P-R INTERVAL: 162 MS
EKG Q-T INTERVAL: 406 MS
EKG QRS DURATION: 82 MS
EKG QTC CALCULATION (BAZETT): 471 MS
EKG R AXIS: -22 DEGREES
EKG T AXIS: 63 DEGREES
EKG VENTRICULAR RATE: 81 BPM

## 2024-10-11 PROCEDURE — 93010 ELECTROCARDIOGRAM REPORT: CPT | Performed by: INTERNAL MEDICINE

## 2025-05-28 ENCOUNTER — HOSPITAL ENCOUNTER (OUTPATIENT)
Dept: CT IMAGING | Age: 65
Discharge: HOME | End: 2025-05-31

## 2025-05-28 DIAGNOSIS — E78.00 PURE HYPERCHOLESTEROLEMIA: ICD-10-CM

## 2025-05-28 PROCEDURE — 75571 CT HRT W/O DYE W/CA TEST: CPT

## 2025-07-23 ENCOUNTER — HOSPITAL ENCOUNTER (OUTPATIENT)
Dept: MAMMOGRAPHY | Age: 65
Discharge: HOME | End: 2025-07-26
Payer: COMMERCIAL

## 2025-07-23 DIAGNOSIS — Z12.31 ENCOUNTER FOR SCREENING MAMMOGRAM FOR MALIGNANT NEOPLASM OF BREAST: ICD-10-CM

## 2025-07-23 PROCEDURE — 77063 BREAST TOMOSYNTHESIS BI: CPT

## (undated) DEVICE — SYRINGE EAR 2OZ ULC SLIMMER TIP FLAT BTM SUCT PWR DISP FOR

## (undated) DEVICE — ZIMMER® STERILE DISPOSABLE TOURNIQUET CUFF WITH PLC, DUAL PORT, SINGLE BLADDER, 18 IN. (46 CM)

## (undated) DEVICE — STERILE HOOK LOCK LATEX FREE ELASTIC BANDAGE 3INX5YD: Brand: HOOK LOCK™

## (undated) DEVICE — DERMABOND SKIN ADH 0.7ML -- DERMABOND ADVANCED 12/BX

## (undated) DEVICE — (D)PREP SKN CHLRAPRP APPL 26ML -- CONVERT TO ITEM 371833

## (undated) DEVICE — SLING ARM 2-37INX8-17IN PCH -- MED

## (undated) DEVICE — REM POLYHESIVE ADULT PATIENT RETURN ELECTRODE: Brand: VALLEYLAB

## (undated) DEVICE — PADDING CAST W2INXL4YD ST COT COHESIVE HND TEARABLE SPEC

## (undated) DEVICE — SUTURE MCRYL SZ 3-0 L27IN ABSRB UD L19MM PS-2 3/8 CIR PRIM Y427H

## (undated) DEVICE — DRAPE,HAND,STERILE: Brand: MEDLINE

## (undated) DEVICE — Device

## (undated) DEVICE — BUTTON SWITCH PENCIL BLADE ELECTRODE, HOLSTER: Brand: EDGE

## (undated) DEVICE — STERILE HOOK LOCK LATEX FREE ELASTIC BANDAGE 2INX5YD: Brand: HOOK LOCK™

## (undated) DEVICE — SOLUTION IV 1000ML 0.9% SOD CHL

## (undated) DEVICE — NEEDLE HYPO 25GA L1.5IN BLU POLYPR HUB S STL REG BVL STR

## (undated) DEVICE — SURGICAL PROCEDURE PACK BASIC ST FRANCIS

## (undated) DEVICE — SPLINT ORTH W3XL12IN RL FRM WRINKLE FREE INTLOK PERFRMANCE

## (undated) DEVICE — BIPOLAR FORCEPS CORD,BANANA LEADS: Brand: VALLEYLAB

## (undated) DEVICE — PADDING CAST COHESIVE 4 YDX3 IN HND TEARABLE COTTON SPEC 100

## (undated) DEVICE — SUTURE NONABSORBABLE MONOFILAMENT 4-0 PS-2 18 IN BLU PROLENE 8682H

## (undated) DEVICE — AMD ANTIMICROBIAL GAUZE SPONGES,12 PLY USP TYPE VII, 0.2% POLYHEXAMETHYLENE BIGUANIDE HCI (PHMB): Brand: CURITY